# Patient Record
Sex: FEMALE | Race: WHITE | Employment: UNEMPLOYED | URBAN - METROPOLITAN AREA
[De-identification: names, ages, dates, MRNs, and addresses within clinical notes are randomized per-mention and may not be internally consistent; named-entity substitution may affect disease eponyms.]

---

## 2019-06-12 ENCOUNTER — HOSPITAL ENCOUNTER (EMERGENCY)
Age: 19
Discharge: HOME | End: 2019-06-13
Attending: EMERGENCY MEDICINE
Payer: COMMERCIAL

## 2019-06-12 VITALS
BODY MASS INDEX: 20.02 KG/M2 | DIASTOLIC BLOOD PRESSURE: 86 MMHG | WEIGHT: 113 LBS | HEART RATE: 90 BPM | RESPIRATION RATE: 18 BRPM | SYSTOLIC BLOOD PRESSURE: 122 MMHG | TEMPERATURE: 98.3 F | HEIGHT: 63 IN | OXYGEN SATURATION: 100 %

## 2019-06-12 DIAGNOSIS — N89.8 VAGINAL DISCHARGE: Primary | ICD-10-CM

## 2019-06-12 PROCEDURE — 81001 URINALYSIS AUTO W/SCOPE: CPT

## 2019-06-12 PROCEDURE — 84703 CHORIONIC GONADOTROPIN ASSAY: CPT

## 2019-06-12 PROCEDURE — 99283 EMERGENCY DEPT VISIT LOW MDM: CPT

## 2019-06-12 PROCEDURE — 87086 URINE CULTURE/COLONY COUNT: CPT

## 2019-06-13 LAB
AMORPHOUS: ABNORMAL /HPF
BACTERIA: ABNORMAL /HPF
BILIRUBIN URINE: NEGATIVE
BLOOD, URINE: NEGATIVE
CLARITY: ABNORMAL
COLOR: YELLOW
EPITHELIAL CELLS, UA: ABNORMAL /HPF
GLUCOSE URINE: NEGATIVE MG/DL
HCG(URINE) PREGNANCY TEST: NEGATIVE
KETONES, URINE: NEGATIVE MG/DL
LEUKOCYTE ESTERASE, URINE: ABNORMAL
MICROSCOPIC EXAMINATION: YES
MUCUS: ABNORMAL /LPF
NITRITE, URINE: NEGATIVE
PH UA: 7.5 (ref 5–8)
PROTEIN UA: ABNORMAL MG/DL
RBC UA: ABNORMAL /HPF (ref 0–2)
SPECIFIC GRAVITY UA: 1.01 (ref 1–1.03)
URINE REFLEX TO CULTURE: YES
URINE TYPE: ABNORMAL
UROBILINOGEN, URINE: 1 E.U./DL
WBC UA: ABNORMAL /HPF (ref 0–5)

## 2019-06-13 NOTE — ED PROVIDER NOTES
problem. I have reviewed the following from the nursing documentation:      Prior to Admission medications    Medication Sig Start Date End Date Taking? Authorizing Provider   NONFORMULARY Birth control   Yes Historical Provider, MD       Allergies as of 06/12/2019 - Review Complete 06/12/2019   Allergen Reaction Noted    Amoxicillin  06/12/2019    Pcn [penicillins]  06/12/2019       History reviewed. No pertinent past medical history. Surgical History: History reviewed. No pertinent surgical history. Family History:  History reviewed. No pertinent family history.     Social History     Socioeconomic History    Marital status: Single     Spouse name: Not on file    Number of children: Not on file    Years of education: Not on file    Highest education level: Not on file   Occupational History    Not on file   Social Needs    Financial resource strain: Not on file    Food insecurity:     Worry: Not on file     Inability: Not on file    Transportation needs:     Medical: Not on file     Non-medical: Not on file   Tobacco Use    Smoking status: Never Smoker    Smokeless tobacco: Never Used   Substance and Sexual Activity    Alcohol use: Yes     Comment: occ    Drug use: Never    Sexual activity: Not on file   Lifestyle    Physical activity:     Days per week: Not on file     Minutes per session: Not on file    Stress: Not on file   Relationships    Social connections:     Talks on phone: Not on file     Gets together: Not on file     Attends [employer] service: Not on file     Active member of club or organization: Not on file     Attends meetings of clubs or organizations: Not on file     Relationship status: Not on file    Intimate partner violence:     Fear of current or ex partner: Not on file     Emotionally abused: Not on file     Physically abused: Not on file     Forced sexual activity: Not on file   Other Topics Concern    Not on file   Social History Narrative    Not on file Nursing notes reviewed. ED Triage Vitals [06/12/19 2352]   Enc Vitals Group      /86      Pulse 90      Resp 18      Temp 98.3 °F (36.8 °C)      Temp Source Oral      SpO2 100 %      Weight 113 lb (51.3 kg)      Height 5' 3\" (1.6 m)      Head Circumference       Peak Flow       Pain Score       Pain Loc       Pain Edu? Excl. in [address]? GENERAL:  Awake, alert. Well developed, well nourished with no apparent distress. HENT:  Normocephalic, Atraumatic, no lacerations. EYES:  Conjunctiva normal, Pupils equal round and reactive to light, extraocular movements normal.  NECK:  Trachea is midline. No stridor. CHEST:  Regular rate and regular rhythm, no murmurs/rubs/gallops, normal S1/S2, chest wall non-tender. LUNGS:  No respiratory distress. No abdominal retractions, no sternal retractions. Clear to auscultation bilaterally, no wheezing, no rhochi, no rales  ABDOMEN:  Soft, non-tender, non-distended. No guarding and no rebound. No costovertebral angle tenderness to palpation. Normal BS, no organomegaly, no abdominal masses  PELVIC:  declined  EXTREMITIES:  Normal range of motion, no edema, no tenderness, no deformity, distal pulses present and equal bilaterally. Moves extremities x4 with purpose. SKIN: Warm, dry and intact. NEUROLOGIC: Normal mental status. Moving all extremities to command. Alert and oriented x4 without focal motor deficit or gross sensory deficit. Normal speech.     PSYCHIATRIC: Not anxious, normal mood and affect, thoughts are linear and organized, without delusions/hallucinations, responds appropriately to questions      LABS and DIAGNOSTIC RESULTS    LABS  Results for orders placed or performed during the hospital encounter of 06/12/19   Urinalysis Reflex to Culture   Result Value Ref Range    Color, UA Yellow Straw/Yellow    Clarity, UA SL CLOUDY (A) Clear    Glucose, Ur Negative Negative mg/dL    Bilirubin Urine Negative Negative    Ketones, Urine Negative Negative mg/dL    Specific Gravity, UA 1.015 1.005 - 1.030    Blood, Urine Negative Negative    pH, UA 7.5 5.0 - 8.0    Protein, UA TRACE (A) Negative mg/dL    Urobilinogen, Urine 1.0 <2.0 E.U./dL    Nitrite, Urine Negative Negative    Leukocyte Esterase, Urine TRACE (A) Negative    Microscopic Examination YES     Urine Reflex to Culture Yes     Urine Type Not Specified    Pregnancy, Urine   Result Value Ref Range    HCG(Urine) Pregnancy Test Negative Detects HCG level >20 MIU/mL   Microscopic Urinalysis   Result Value Ref Range    Mucus, UA Rare (A) /LPF    WBC, UA 3-5 0 - 5 /HPF    RBC, UA 0-2 0 - 2 /HPF    Epi Cells 20-50 /HPF    Bacteria, UA Rare (A) /HPF    Amorphous, UA 3+ (A) /HPF       PROCEDURES      MEDICAL DECISION MAKING    Procedures/interventions/images ordered for this visit  Orders Placed This Encounter   Procedures    Urine Culture    C.trachomatis N.gonorrhoeae DNA    Wet prep, genital    Urinalysis Reflex to Culture    Pregnancy, Urine    Microscopic Urinalysis       Medications ordered for this visit  No orders of the defined types were placed in this encounter. This is a very pleasant female patient who presents to ED for a vaginal complaint. Patient declined any further testing such as PCR for GC chlamydia, wet prep. I feel this is reasonable given that she most likely had all of his done at the urgent care visit 2 days ago. I encouraged her to be patient and wait for the results from her most recent visit. She understands that she is to abstain from sexual activity until f/u with her gynecologist or her PCP. Candida vaginitis, bacterial vaginosis, trichomonas, UTI and pregnancy have all been considered. Additionally, PID, cervicitis, syphilis, HSV, and atrophic dermatitis have also been considered but felt unlikely.  Based on the history, exams, and lab tests performed today, I do not see any acute medical condition that would require hospitalization, surgical intervention, or

## 2019-06-14 LAB — URINE CULTURE, ROUTINE: NORMAL

## 2019-09-14 ENCOUNTER — HOSPITAL ENCOUNTER (EMERGENCY)
Age: 19
Discharge: HOME | End: 2019-09-14
Payer: COMMERCIAL

## 2019-09-14 VITALS
OXYGEN SATURATION: 100 % | DIASTOLIC BLOOD PRESSURE: 90 MMHG | HEART RATE: 86 BPM | TEMPERATURE: 98 F | RESPIRATION RATE: 16 BRPM | SYSTOLIC BLOOD PRESSURE: 138 MMHG | WEIGHT: 114 LBS | BODY MASS INDEX: 20.19 KG/M2

## 2019-09-14 DIAGNOSIS — N91.2 AMENORRHEA: Primary | ICD-10-CM

## 2019-09-14 LAB
BACTERIA: ABNORMAL /HPF
BILIRUBIN URINE: NEGATIVE
BLOOD, URINE: NEGATIVE
CLARITY: CLEAR
COLOR: YELLOW
EPITHELIAL CELLS, UA: ABNORMAL /HPF
GLUCOSE URINE: NEGATIVE MG/DL
HCG(URINE) PREGNANCY TEST: NEGATIVE
KETONES, URINE: NEGATIVE MG/DL
LEUKOCYTE ESTERASE, URINE: ABNORMAL
MICROSCOPIC EXAMINATION: YES
NITRITE, URINE: NEGATIVE
PH UA: 6.5 (ref 5–8)
PROTEIN UA: ABNORMAL MG/DL
RBC UA: ABNORMAL /HPF (ref 0–2)
SPECIFIC GRAVITY UA: 1.01 (ref 1–1.03)
URINE REFLEX TO CULTURE: YES
URINE TYPE: ABNORMAL
UROBILINOGEN, URINE: 0.2 E.U./DL
WBC UA: ABNORMAL /HPF (ref 0–5)

## 2019-09-14 PROCEDURE — 84703 CHORIONIC GONADOTROPIN ASSAY: CPT

## 2019-09-14 PROCEDURE — 87086 URINE CULTURE/COLONY COUNT: CPT

## 2019-09-14 PROCEDURE — 99283 EMERGENCY DEPT VISIT LOW MDM: CPT

## 2019-09-14 PROCEDURE — 81001 URINALYSIS AUTO W/SCOPE: CPT

## 2019-09-14 RX ORDER — NORETHINDRONE ACETATE AND ETHINYL ESTRADIOL 1MG-20(21)
KIT ORAL
COMMUNITY
Start: 2019-09-10 | End: 2019-10-14 | Stop reason: SDUPTHER

## 2019-09-14 ASSESSMENT — ENCOUNTER SYMPTOMS
GASTROINTESTINAL NEGATIVE: 1
RESPIRATORY NEGATIVE: 1

## 2019-09-14 NOTE — ED NOTES
Discharge instructions reviewed with Ms. Chrissy Chavez. She verbalized understanding. Copy of discharge instructions and prescriptions given. Ms. Chrissy Chavez was discharged to home in good condition per personal vehicle, friend/family driving. She exited the ED without difficulty.    Merline Arm, RN  09/14/19 6386

## 2019-09-14 NOTE — ED PROVIDER NOTES
**[facility]  EMERGENC[facility]NCOUNTER      Pt Name: Dulce Maria Costello  GNQ:[id number]  [place] 2000  Date of evaluation: 9/14/2019  Provider: Yolande Bolanos PA-C      Chief Complaint:    Chief Complaint   Patient presents with    Amenorrhea     states a week late on period. did a preg test at urgent care on thursday that was neg. Nursing Notes, Past Medical Hx, Past Surgical Hx, Social Hx, Allergies, and Family Hx were all reviewed and agreed with or any disagreements were addressed in the HPI.    HPI:  (Location, Duration, Timing, Severity,Quality, Assoc Sx, Context, Modifying factors)  This is a  20 y.o. female brought in today by private vehicle for complaints of amenorrhea times one week. Patient states she was supposed to have her period about 1 week ago. Patient states she is on birth control Blisobi Fe and has been taking it appropriately at the same time daily. Patient denies any fevers, chills, nausea, vomiting abdominal pain, dysuria, hematuria. Denies any vaginal discharge or foul odor. Patient states she did take 2 pregnancy tests at home which were both negative at this time. Patient states she recently started school and has had increased stress over the past couple of weeks. She also states she had a recent viral infection. No aggravating symptoms. No alleviating symptoms. Patient denies any vaginal spotting or pelvic cramping. Patient denies any other complaints at this time. She has not tried anything else at this time for symptomatic relief. PastMedical/Surgical History:  History reviewed. No pertinent past medical history. History reviewed. No pertinent surgical history.     Medications:  Current Discharge Medication List      CONTINUE these medications which have NOT CHANGED    Details   BLISOVI FE 1/20 1-20 MG-MCG per tablet       NONFORMULARY Birth control               Review of Systems:  Review of Systems   Constitutional: Negative. HENT: Negative. Respiratory: Negative. Cardiovascular: Negative. Gastrointestinal: Negative. Genitourinary: Positive for menstrual problem. Musculoskeletal: Negative. Skin: Negative. Neurological: Negative. Hematological: Negative. Positives and Pertinent negatives as per HPI. Except as noted above in the ROS, problem specific ROS was completed and is negative. Physical Exam:  Physical Exam   Constitutional: She is oriented to person, place, and time. Vital signs are normal. She appears well-developed and well-nourished. Non-toxic appearance. She does not have a sickly appearance. She does not appear ill. No distress. HENT:   Head: Normocephalic and atraumatic. Nose: Nose normal.   Eyes: Right eye exhibits no discharge. Left eye exhibits no discharge. Neck: Normal range of motion. Neck supple. Cardiovascular: Normal rate, regular rhythm and normal heart sounds. Exam reveals no gallop. No murmur heard. Pulses:       Radial pulses are 2+ on the right side, and 2+ on the left side. Pulmonary/Chest: Effort normal and breath sounds normal. No stridor. No respiratory distress. She has no decreased breath sounds. She has no wheezes. She has no rhonchi. She has no rales. She exhibits no tenderness. Musculoskeletal: Normal range of motion. She exhibits no deformity. Neurological: She is alert and oriented to person, place, and time. Skin: Skin is warm, dry and intact. No rash noted. She is not diaphoretic. Psychiatric: She has a normal mood and affect. Her behavior is normal.   Nursing note and vitals reviewed.       MEDICAL DECISION MAKING    Vitals:    Vitals:    09/14/19 1310   BP: (!) 138/90   Pulse: 86   Resp: 16   Temp: 98 °F (36.7 °C)   TempSrc: Oral   SpO2: 100%   Weight: 114 lb (51.7 kg)       LABS:  Labs Reviewed   URINE RT REFLEX TO CULTURE - Abnormal; Notable for the following components:       Result Value    Protein,

## 2019-09-15 LAB — URINE CULTURE, ROUTINE: NORMAL

## 2019-09-30 ENCOUNTER — OFFICE VISIT (OUTPATIENT)
Dept: OBGYN CLINIC | Age: 19
End: 2019-09-30
Payer: COMMERCIAL

## 2019-09-30 VITALS
WEIGHT: 114 LBS | SYSTOLIC BLOOD PRESSURE: 108 MMHG | TEMPERATURE: 98.1 F | HEIGHT: 63 IN | HEART RATE: 79 BPM | DIASTOLIC BLOOD PRESSURE: 62 MMHG | BODY MASS INDEX: 20.2 KG/M2

## 2019-09-30 DIAGNOSIS — Z79.3 ON ORAL CONTRACEPTIVE PILLS FOR NON-CONTRACEPTION INDICATION: ICD-10-CM

## 2019-09-30 DIAGNOSIS — N92.6 MISSED MENSES: Primary | ICD-10-CM

## 2019-09-30 PROBLEM — N94.6 DYSMENORRHEA: Status: ACTIVE | Noted: 2017-08-26

## 2019-09-30 PROCEDURE — G8420 CALC BMI NORM PARAMETERS: HCPCS | Performed by: OBSTETRICS & GYNECOLOGY

## 2019-09-30 PROCEDURE — 99202 OFFICE O/P NEW SF 15 MIN: CPT | Performed by: OBSTETRICS & GYNECOLOGY

## 2019-09-30 PROCEDURE — G8427 DOCREV CUR MEDS BY ELIG CLIN: HCPCS | Performed by: OBSTETRICS & GYNECOLOGY

## 2019-09-30 PROCEDURE — 1036F TOBACCO NON-USER: CPT | Performed by: OBSTETRICS & GYNECOLOGY

## 2019-09-30 PROCEDURE — 36415 COLL VENOUS BLD VENIPUNCTURE: CPT | Performed by: OBSTETRICS & GYNECOLOGY

## 2019-09-30 RX ORDER — NAPROXEN 250 MG/1
500 TABLET ORAL 2 TIMES DAILY PRN
COMMUNITY
Start: 2017-08-26

## 2019-09-30 ASSESSMENT — ENCOUNTER SYMPTOMS
SORE THROAT: 0
SHORTNESS OF BREATH: 0
NAUSEA: 0
ABDOMINAL PAIN: 0
CONSTIPATION: 0
COUGH: 0
DIARRHEA: 0
VOMITING: 0

## 2019-09-30 NOTE — PROGRESS NOTES
New Patient Gynecologic Exam      CC:   Chief Complaint   Patient presents with    Amenorrhea     NEW PATIENT        HPI:  20 y.o.  presents for evaluation of missed menses    Patient seen and examined. Patient is overall doing well today    Patient is currently on Blisovi for irregular painful menses. Patient reports prior to [facility] she had menses every 2-3 weeks that were very heavy. Patient started OCPs 2 years ago. Reports with OCPs her menses have been very light and regular with the placebo. Last menses in August with placebo week, no bleeding with placebo pills in September. Reports pain has improved with OCPs. Negative pregnancy test at home x6. Patient reports increased stress surrounding start of Respiratory Therapy program at the end of August.      Patient is sexually active, denies concerns. Health Maintenance:  Birth control: Blisovi  Pregnancy plans: None currently  Safe relationship: Together 3 years    Screening:  Last pap smear: N/A  History of abnormal pap smears: N/A  STI screenin2018    Review of Systems:   Review of Systems   Constitutional: Negative for chills and fever. HENT: Negative for congestion, sneezing and sore throat. Respiratory: Negative for cough and shortness of breath. Cardiovascular: Negative for chest pain and palpitations. Gastrointestinal: Negative for abdominal pain, constipation, diarrhea, nausea and vomiting. Genitourinary: Positive for menstrual problem. Negative for dysuria, frequency, pelvic pain and vaginal discharge. Allergic/Immunologic: Negative for environmental allergies. Neurological: Negative for dizziness and headaches. Primary Care Physician: Helen Medina MD    Obstetric History  OB History    Para Term  AB Living   0 0 0 0 0 0   SAB TAB Ectopic Molar Multiple Live Births   0 0 0 0 0 0       GynecologicHistory  Menstrual History:   LMP: Patient's last menstrual period was 2019.     Age of

## 2019-10-01 LAB — GONADOTROPIN, CHORIONIC (HCG) QUANT: <5 MIU/ML

## 2019-10-14 RX ORDER — NORETHINDRONE ACETATE AND ETHINYL ESTRADIOL 1MG-20(21)
1 KIT ORAL DAILY
Qty: 1 PACKET | Refills: 11 | Status: SHIPPED | OUTPATIENT
Start: 2019-10-14 | End: 2020-01-31

## 2019-10-14 NOTE — TELEPHONE ENCOUNTER
Patient called for a refill. Her pharmacy called her and informed her she is out of [facility]. She was not sure when she was in for her new patient appointment on 9/30/19. Refill pending.

## 2019-12-27 ENCOUNTER — HOSPITAL ENCOUNTER (EMERGENCY)
Age: 19
Discharge: LEFT BEFORE DISCHARGE | End: 2019-12-27
Payer: COMMERCIAL

## 2019-12-27 VITALS
TEMPERATURE: 99 F | HEIGHT: 63 IN | SYSTOLIC BLOOD PRESSURE: 130 MMHG | HEART RATE: 107 BPM | OXYGEN SATURATION: 100 % | WEIGHT: 115 LBS | DIASTOLIC BLOOD PRESSURE: 82 MMHG | RESPIRATION RATE: 16 BRPM | BODY MASS INDEX: 20.38 KG/M2

## 2019-12-27 PROCEDURE — 4500000002 HC ER NO CHARGE

## 2019-12-27 ASSESSMENT — PAIN DESCRIPTION - ORIENTATION: ORIENTATION: LEFT;LOWER

## 2019-12-27 ASSESSMENT — PAIN DESCRIPTION - FREQUENCY: FREQUENCY: CONTINUOUS

## 2019-12-27 ASSESSMENT — PAIN SCALES - GENERAL: PAINLEVEL_OUTOF10: 5

## 2019-12-27 ASSESSMENT — PAIN DESCRIPTION - LOCATION: LOCATION: ABDOMEN

## 2019-12-28 ENCOUNTER — HOSPITAL ENCOUNTER (EMERGENCY)
Age: 19
Discharge: HOME | End: 2019-12-28
Attending: EMERGENCY MEDICINE
Payer: COMMERCIAL

## 2019-12-28 ENCOUNTER — APPOINTMENT (OUTPATIENT)
Dept: CT IMAGING | Age: 19
End: 2019-12-28
Payer: COMMERCIAL

## 2019-12-28 VITALS
SYSTOLIC BLOOD PRESSURE: 130 MMHG | WEIGHT: 115 LBS | OXYGEN SATURATION: 99 % | DIASTOLIC BLOOD PRESSURE: 80 MMHG | BODY MASS INDEX: 20.38 KG/M2 | TEMPERATURE: 98.1 F | HEIGHT: 63 IN | RESPIRATION RATE: 16 BRPM | HEART RATE: 87 BPM

## 2019-12-28 DIAGNOSIS — R10.30 LOWER ABDOMINAL PAIN: Primary | ICD-10-CM

## 2019-12-28 DIAGNOSIS — K52.9 COLITIS: ICD-10-CM

## 2019-12-28 LAB
BACTERIA: ABNORMAL /HPF
BILIRUBIN URINE: NEGATIVE
BLOOD, URINE: ABNORMAL
CLARITY: CLEAR
COLOR: YELLOW
EPITHELIAL CELLS, UA: ABNORMAL /HPF
GLUCOSE URINE: NEGATIVE MG/DL
HCG(URINE) PREGNANCY TEST: NEGATIVE
KETONES, URINE: NEGATIVE MG/DL
LEUKOCYTE ESTERASE, URINE: NEGATIVE
MICROSCOPIC EXAMINATION: YES
NITRITE, URINE: NEGATIVE
PH UA: 6 (ref 5–8)
PROTEIN UA: 30 MG/DL
RBC UA: ABNORMAL /HPF (ref 0–2)
SPECIFIC GRAVITY UA: >=1.03 (ref 1–1.03)
URINE TYPE: ABNORMAL
UROBILINOGEN, URINE: 0.2 E.U./DL
WBC UA: ABNORMAL /HPF (ref 0–5)

## 2019-12-28 PROCEDURE — 99284 EMERGENCY DEPT VISIT MOD MDM: CPT

## 2019-12-28 PROCEDURE — 81001 URINALYSIS AUTO W/SCOPE: CPT

## 2019-12-28 PROCEDURE — 84703 CHORIONIC GONADOTROPIN ASSAY: CPT

## 2019-12-28 PROCEDURE — 74176 CT ABD & PELVIS W/O CONTRAST: CPT

## 2019-12-28 RX ORDER — RANITIDINE 150 MG/1
150 TABLET ORAL DAILY
COMMUNITY
Start: 2019-12-20 | End: 2020-06-23

## 2019-12-28 RX ORDER — METRONIDAZOLE 500 MG/1
500 TABLET ORAL 2 TIMES DAILY
Qty: 14 TABLET | Refills: 0 | Status: SHIPPED | OUTPATIENT
Start: 2019-12-28 | End: 2020-01-04

## 2019-12-28 ASSESSMENT — PAIN DESCRIPTION - LOCATION: LOCATION: ABDOMEN

## 2019-12-28 ASSESSMENT — PAIN DESCRIPTION - PAIN TYPE: TYPE: ACUTE PAIN

## 2019-12-28 ASSESSMENT — PAIN SCALES - GENERAL: PAINLEVEL_OUTOF10: 5

## 2020-01-01 ENCOUNTER — HOSPITAL ENCOUNTER (EMERGENCY)
Age: 20
Discharge: HOME | End: 2020-01-02
Payer: COMMERCIAL

## 2020-01-01 LAB
A/G RATIO: 1.6 (ref 1.1–2.2)
ALBUMIN SERPL-MCNC: 5.2 G/DL (ref 3.4–5)
ALP BLD-CCNC: 52 U/L (ref 40–129)
ALT SERPL-CCNC: 13 U/L (ref 10–40)
AMORPHOUS: ABNORMAL /HPF
ANION GAP SERPL CALCULATED.3IONS-SCNC: 11 MMOL/L (ref 3–16)
AST SERPL-CCNC: 16 U/L (ref 15–37)
BASOPHILS ABSOLUTE: 0 K/UL (ref 0–0.2)
BASOPHILS RELATIVE PERCENT: 0.2 %
BILIRUB SERPL-MCNC: 0.5 MG/DL (ref 0–1)
BILIRUBIN URINE: NEGATIVE
BLOOD, URINE: NEGATIVE
BUN BLDV-MCNC: 13 MG/DL (ref 7–20)
CALCIUM SERPL-MCNC: 9.8 MG/DL (ref 8.3–10.6)
CASTS: ABNORMAL /LPF
CHLORIDE BLD-SCNC: 100 MMOL/L (ref 99–110)
CLARITY: CLEAR
CO2: 25 MMOL/L (ref 21–32)
COLOR: YELLOW
CREAT SERPL-MCNC: 0.8 MG/DL (ref 0.6–1.1)
EOSINOPHILS ABSOLUTE: 0 K/UL (ref 0–0.6)
EOSINOPHILS RELATIVE PERCENT: 0 %
EPITHELIAL CELLS, UA: ABNORMAL /HPF
GFR AFRICAN AMERICAN: >60
GFR NON-AFRICAN AMERICAN: >60
GLOBULIN: 3.2 G/DL
GLUCOSE BLD-MCNC: 130 MG/DL (ref 70–99)
GLUCOSE URINE: NEGATIVE MG/DL
HCG(URINE) PREGNANCY TEST: NEGATIVE
HCT VFR BLD CALC: 45.3 % (ref 36–48)
HEMOGLOBIN: 15.6 G/DL (ref 12–16)
KETONES, URINE: NEGATIVE MG/DL
LEUKOCYTE ESTERASE, URINE: NEGATIVE
LIPASE: 36 U/L (ref 13–60)
LYMPHOCYTES ABSOLUTE: 0.8 K/UL (ref 1–5.1)
LYMPHOCYTES RELATIVE PERCENT: 11.2 %
MCH RBC QN AUTO: 31 PG (ref 26–34)
MCHC RBC AUTO-ENTMCNC: 34.4 G/DL (ref 31–36)
MCV RBC AUTO: 90.2 FL (ref 80–100)
MICROSCOPIC EXAMINATION: YES
MONOCYTES ABSOLUTE: 0.3 K/UL (ref 0–1.3)
MONOCYTES RELATIVE PERCENT: 3.9 %
MUCUS: ABNORMAL /LPF
NEUTROPHILS ABSOLUTE: 6 K/UL (ref 1.7–7.7)
NEUTROPHILS RELATIVE PERCENT: 84.7 %
NITRITE, URINE: NEGATIVE
PDW BLD-RTO: 12.3 % (ref 12.4–15.4)
PH UA: 7 (ref 5–8)
PLATELET # BLD: 156 K/UL (ref 135–450)
PMV BLD AUTO: 8.7 FL (ref 5–10.5)
POTASSIUM SERPL-SCNC: 3.5 MMOL/L (ref 3.5–5.1)
PROTEIN UA: 30 MG/DL
RBC # BLD: 5.02 M/UL (ref 4–5.2)
RBC UA: ABNORMAL /HPF (ref 0–2)
SODIUM BLD-SCNC: 136 MMOL/L (ref 136–145)
SPECIFIC GRAVITY UA: 1.02 (ref 1–1.03)
TOTAL PROTEIN: 8.4 G/DL (ref 6.4–8.2)
URINE REFLEX TO CULTURE: ABNORMAL
URINE TYPE: ABNORMAL
UROBILINOGEN, URINE: 0.2 E.U./DL
WBC # BLD: 7.1 K/UL (ref 4–11)
WBC UA: ABNORMAL /HPF (ref 0–5)

## 2020-01-01 PROCEDURE — 83690 ASSAY OF LIPASE: CPT

## 2020-01-01 PROCEDURE — 99284 EMERGENCY DEPT VISIT MOD MDM: CPT

## 2020-01-01 PROCEDURE — 84703 CHORIONIC GONADOTROPIN ASSAY: CPT

## 2020-01-01 PROCEDURE — 85025 COMPLETE CBC W/AUTO DIFF WBC: CPT

## 2020-01-01 PROCEDURE — 85379 FIBRIN DEGRADATION QUANT: CPT

## 2020-01-01 PROCEDURE — 80053 COMPREHEN METABOLIC PANEL: CPT

## 2020-01-01 PROCEDURE — 81001 URINALYSIS AUTO W/SCOPE: CPT

## 2020-01-01 RX ORDER — CETIRIZINE HYDROCHLORIDE 10 MG/1
10 TABLET ORAL ONCE
Status: DISCONTINUED | OUTPATIENT
Start: 2020-01-01 | End: 2020-01-01

## 2020-01-01 RX ORDER — DICYCLOMINE HYDROCHLORIDE 10 MG/1
10 CAPSULE ORAL ONCE
Status: COMPLETED | OUTPATIENT
Start: 2020-01-02 | End: 2020-01-02

## 2020-01-01 RX ORDER — OXYCODONE HYDROCHLORIDE AND ACETAMINOPHEN 5; 325 MG/1; MG/1
1 TABLET ORAL ONCE
Status: DISCONTINUED | OUTPATIENT
Start: 2020-01-01 | End: 2020-01-01

## 2020-01-01 RX ORDER — 0.9 % SODIUM CHLORIDE 0.9 %
1000 INTRAVENOUS SOLUTION INTRAVENOUS ONCE
Status: COMPLETED | OUTPATIENT
Start: 2020-01-02 | End: 2020-01-02

## 2020-01-01 RX ORDER — ONDANSETRON 2 MG/ML
4 INJECTION INTRAMUSCULAR; INTRAVENOUS ONCE
Status: COMPLETED | OUTPATIENT
Start: 2020-01-02 | End: 2020-01-02

## 2020-01-01 RX ORDER — NAPROXEN 250 MG/1
250 TABLET ORAL ONCE
Status: DISCONTINUED | OUTPATIENT
Start: 2020-01-01 | End: 2020-01-01

## 2020-01-01 ASSESSMENT — PAIN DESCRIPTION - FREQUENCY: FREQUENCY: CONTINUOUS

## 2020-01-01 ASSESSMENT — PAIN DESCRIPTION - LOCATION: LOCATION: ABDOMEN;BACK

## 2020-01-01 ASSESSMENT — PAIN DESCRIPTION - PAIN TYPE: TYPE: ACUTE PAIN

## 2020-01-01 ASSESSMENT — PAIN DESCRIPTION - PROGRESSION: CLINICAL_PROGRESSION: GRADUALLY WORSENING

## 2020-01-01 ASSESSMENT — PAIN DESCRIPTION - ORIENTATION: ORIENTATION: RIGHT

## 2020-01-01 ASSESSMENT — PAIN DESCRIPTION - DESCRIPTORS: DESCRIPTORS: SHARP

## 2020-01-01 ASSESSMENT — PAIN DESCRIPTION - ONSET: ONSET: GRADUAL

## 2020-01-01 ASSESSMENT — PAIN SCALES - GENERAL: PAINLEVEL_OUTOF10: 5

## 2020-01-02 VITALS
HEART RATE: 71 BPM | HEIGHT: 63 IN | BODY MASS INDEX: 20.38 KG/M2 | DIASTOLIC BLOOD PRESSURE: 74 MMHG | WEIGHT: 115 LBS | SYSTOLIC BLOOD PRESSURE: 117 MMHG | RESPIRATION RATE: 16 BRPM | OXYGEN SATURATION: 99 % | TEMPERATURE: 98.2 F

## 2020-01-02 LAB — D DIMER: <200 NG/ML DDU (ref 0–229)

## 2020-01-02 PROCEDURE — 96374 THER/PROPH/DIAG INJ IV PUSH: CPT

## 2020-01-02 PROCEDURE — 96361 HYDRATE IV INFUSION ADD-ON: CPT

## 2020-01-02 PROCEDURE — 2580000003 HC RX 258: Performed by: NURSE PRACTITIONER

## 2020-01-02 PROCEDURE — 6370000000 HC RX 637 (ALT 250 FOR IP): Performed by: NURSE PRACTITIONER

## 2020-01-02 PROCEDURE — 6360000002 HC RX W HCPCS: Performed by: NURSE PRACTITIONER

## 2020-01-02 RX ORDER — DICYCLOMINE HYDROCHLORIDE 10 MG/1
10 CAPSULE ORAL
Qty: 120 CAPSULE | Refills: 3 | Status: SHIPPED | OUTPATIENT
Start: 2020-01-02

## 2020-01-02 RX ADMIN — DICYCLOMINE HYDROCHLORIDE 10 MG: 10 CAPSULE ORAL at 00:15

## 2020-01-02 RX ADMIN — SODIUM CHLORIDE 1000 ML: 9 INJECTION, SOLUTION INTRAVENOUS at 00:15

## 2020-01-02 RX ADMIN — ONDANSETRON HYDROCHLORIDE 4 MG: 2 INJECTION, SOLUTION INTRAMUSCULAR; INTRAVENOUS at 00:16

## 2020-01-02 ASSESSMENT — ENCOUNTER SYMPTOMS
RHINORRHEA: 0
COLOR CHANGE: 0
SORE THROAT: 0
ABDOMINAL PAIN: 1
SHORTNESS OF BREATH: 0

## 2020-01-02 NOTE — ED PROVIDER NOTES
Evaluated by Advanced Practice Provider          [facility]  EMERGENCY DEPARTMENT ENCOUNTER        Pt Name: Maranda Bennett  MRN: [id number]  [place] 2000  Dateof evaluation: 1/1/2020  Provider: CHRIST Arredondo - CNP  PCP: Lo Orellana MD  ED Attending: No att. providers found    [address] [facility]       Chief Complaint   Patient presents with    Dizziness     pt states had lightheadedness and dizziness this morning. Pt states that she has had an increase in urinary frequence and rt sided back and abd pain. HISTORY OF PRESENTILLNESS   (Location/Symptom, Timing/Onset, Context/Setting, Quality, Duration, Modifying Factors, Severity)  Note limiting factors. Maranda Bennett is a 20 y.o. female for lower abd pain. Onset was awhile. Duration has been since the onet. Context includes pt states she has had lower abd pain for \"awhile\". She reports she was just seen and had a ct scan. She was told she had colitis but states she was dizzy this am. Pt is on birth control. Alleviating factors include nothing. Aggravating factors include nothing. Pain is 5/10. nothing has been used for pain today. Nursing Notes were all reviewed and agreed with or any disagreements were addressed  in the HPI. REVIEW OF SYSTEMS    (2-9 systems for level 4, 10 or more for level 5)     Review of Systems   Constitutional: Negative for fever. HENT: Negative for congestion, rhinorrhea and sore throat. Respiratory: Negative for shortness of breath. Cardiovascular: Negative for chest pain. Gastrointestinal: Positive for abdominal pain. Genitourinary: Negative for decreased urine volume and difficulty urinating. Musculoskeletal: Negative for arthralgias and myalgias. Skin: Negative for color change and rash. Neurological: Positive for dizziness. Negative for light-headedness. Psychiatric/Behavioral: Negative for agitation. All other systems reviewed and are negative.       Positives and following components:    Glucose 130 (*)     Total Protein 8.4 (*)     Alb 5.2 (*)     All other components within normal limits    Narrative:     Performed at:  [facility] [address],  [place], [employer]   Phone [phone]   MICROSCOPIC URINALYSIS - Abnormal; Notable for the following components:    Casts 0-1 Hyaline (*)     Mucus, UA 2+ (*)     Amorphous, UA 2+ (*)     All other components within normal limits    Narrative:     Performed at:  [facility] [address],  [place], [email or web address]   Phone [phone]   PREGNANCY, URINE    Narrative:     Performed at:  [place] [address],  [place], [place] [email or web address]   Phone [phone]   LIPASE    Narrative:     Performed at:  [place] [address],  [place], [place] [facility]   Phone [phone]   D-DIMER, QUANTITATIVE    Narrative:     Performed at:  [facility]) [facility] [address],  [place], [employer]   Phone [phone]       All other labs werewithin normal range or not returned as of this dictation. EKG: All EKG's are interpreted by the Emergency Department Physician who either signs or Co-signs this chart in the absence of acardiologist.  Please see their note for interpretation of EKG. RADIOLOGY:           Interpretation per the Radiologist below, if available at the time of this note:    No orders to display     No results found.       PROCEDURES   Unless otherwise noted below, none     Procedures     CRITICAL CARE TIME   N/A    CONSULTS:  None      EMERGENCYDEPARTMENT COURSE and DIFFERENTIAL DIAGNOSIS/MDM:   Vitals:    Vitals:    01/01/20 2208 01/01/20 2211 01/01/20 2315 01/02/20 0006   BP:  (!) 158/97 126/84 117/74   Pulse: 102   71   Resp: 16   16   TempSrc: Oral      SpO2: 99%  100% 99%   Weight: 115 lb (52.2 kg)      Height: 5' 3\" (1.6 m)          Patient was given the following medications:  Medications   0.9 % sodium chloride bolus (1,000 mLs Intravenous New Bag 1/2/20 0015)   ondansetron (ZOFRAN) injection 4 mg (4 mg Intravenous Given 1/2/20 0016)   dicyclomine (BENTYL) capsule 10 mg (10 mg Oral Given 1/2/20 0015)       Patient was seen and evaluated by myself. Patient here for complaints of dizziness however she is also stating that she has had lower abdominal pain. Patient reports that she was seen recently for her pain and diagnosed with colitis. She reports she does have an appointment scheduled in the morning with a GI doctor. Patient states that she did have an episode of dizziness today when she got up but her symptoms have since resolved from the dizziness. On exam she is awake and alert she was found to be tachycardic however she was very anxious and tearful. She was given IV fluids Bentyl and Zofran. On reevaluation her heart rate has improved and she states that she does feel better. Patient will be discharged home with a prescription for Bentyl. She was encouraged to keep her appointment with the GI doctor later today. She was encouraged to follow-up with her primary care doctor in the next few days and return to the ED for worsening symptoms. I did discuss the case with Dr. Maricela Mcburney however he did not see the patient he was in agreement with the plan. The patient tolerated their visit well. I have evaluated thispatient. My supervising physician was available for consultation. The patient and / or the family were informed of the results of any tests, a time was given to answer questions, a plan was proposed and they agreed Gallo Mina. FINAL IMPRESSION      1. Dizziness    2. Lightheadedness    3.  History of colitis          DISPOSITION/PLAN   DISPOSITION Decision To Discharge 01/02/2020 12:52:38 AM      PATIENT REFERRED TO:  Werner Pastrana MD  [address] [address]  [phone]    Schedule an appointment as soon as possible for a visit in 2 days  for re-evaluation    [place] [facility]  [address] [facility]  [phone]    If symptoms worsen      DISCHARGE MEDICATIONS:  New Prescriptions    DICYCLOMINE (BENTYL) 10 MG CAPSULE    Take 1 capsule by mouth 4 times daily (before meals and nightly)       DISCONTINUED MEDICATIONS:  Discontinued Medications    No medications on file              (Please note that portions of this note were completed with a voice recognition program.  Efforts were made to edit the dictations but occasionally words are mis-transcribed.)    CHRIST Zurita CNP (electronically signed)         CHRIST Zurita CNP  01/02/20 0102

## 2020-01-30 NOTE — TELEPHONE ENCOUNTER
Pt states she received notice from her insurance that they no longer cover her OCP- Blisovi FE. She states she has done really well on this medication and wants to know if a similar medication can be ordered.  Please advise

## 2020-01-31 RX ORDER — NORETHINDRONE ACETATE AND ETHINYL ESTRADIOL 1MG-20(21)
1 KIT ORAL DAILY
Qty: 1 PACKET | Refills: 3 | Status: SHIPPED | OUTPATIENT
Start: 2020-01-31 | End: 2020-06-23

## 2020-03-13 ENCOUNTER — HOSPITAL ENCOUNTER (EMERGENCY)
Age: 20
Discharge: HOME | End: 2020-03-13
Payer: COMMERCIAL

## 2020-03-13 VITALS
OXYGEN SATURATION: 100 % | HEIGHT: 63 IN | WEIGHT: 115 LBS | BODY MASS INDEX: 20.38 KG/M2 | TEMPERATURE: 98.7 F | SYSTOLIC BLOOD PRESSURE: 129 MMHG | DIASTOLIC BLOOD PRESSURE: 83 MMHG | HEART RATE: 123 BPM | RESPIRATION RATE: 20 BRPM

## 2020-03-13 LAB
A/G RATIO: 2 (ref 1.1–2.2)
ALBUMIN SERPL-MCNC: 5 G/DL (ref 3.4–5)
ALP BLD-CCNC: 50 U/L (ref 40–129)
ALT SERPL-CCNC: 10 U/L (ref 10–40)
ANION GAP SERPL CALCULATED.3IONS-SCNC: 14 MMOL/L (ref 3–16)
AST SERPL-CCNC: 19 U/L (ref 15–37)
BASOPHILS ABSOLUTE: 0 K/UL (ref 0–0.2)
BASOPHILS RELATIVE PERCENT: 0.3 %
BILIRUB SERPL-MCNC: 0.5 MG/DL (ref 0–1)
BUN BLDV-MCNC: 14 MG/DL (ref 7–20)
CALCIUM SERPL-MCNC: 9.6 MG/DL (ref 8.3–10.6)
CHLORIDE BLD-SCNC: 106 MMOL/L (ref 99–110)
CO2: 22 MMOL/L (ref 21–32)
CREAT SERPL-MCNC: 0.7 MG/DL (ref 0.6–1.1)
D DIMER: 218 NG/ML DDU (ref 0–229)
EKG ATRIAL RATE: 116 BPM
EKG DIAGNOSIS: NORMAL
EKG P AXIS: 57 DEGREES
EKG P-R INTERVAL: 168 MS
EKG Q-T INTERVAL: 332 MS
EKG QRS DURATION: 90 MS
EKG QTC CALCULATION (BAZETT): 461 MS
EKG R AXIS: 84 DEGREES
EKG T AXIS: -32 DEGREES
EKG VENTRICULAR RATE: 116 BPM
EOSINOPHILS ABSOLUTE: 0 K/UL (ref 0–0.6)
EOSINOPHILS RELATIVE PERCENT: 0.4 %
GFR AFRICAN AMERICAN: >60
GFR NON-AFRICAN AMERICAN: >60
GLOBULIN: 2.5 G/DL
GLUCOSE BLD-MCNC: 91 MG/DL (ref 70–99)
HCG QUALITATIVE: NEGATIVE
HCT VFR BLD CALC: 43.1 % (ref 36–48)
HEMOGLOBIN: 15.1 G/DL (ref 12–16)
LYMPHOCYTES ABSOLUTE: 1.5 K/UL (ref 1–5.1)
LYMPHOCYTES RELATIVE PERCENT: 14.9 %
MCH RBC QN AUTO: 30.9 PG (ref 26–34)
MCHC RBC AUTO-ENTMCNC: 35 G/DL (ref 31–36)
MCV RBC AUTO: 88.2 FL (ref 80–100)
MONOCYTES ABSOLUTE: 0.4 K/UL (ref 0–1.3)
MONOCYTES RELATIVE PERCENT: 3.6 %
NEUTROPHILS ABSOLUTE: 8 K/UL (ref 1.7–7.7)
NEUTROPHILS RELATIVE PERCENT: 80.8 %
PDW BLD-RTO: 12.6 % (ref 12.4–15.4)
PLATELET # BLD: 113 K/UL (ref 135–450)
PMV BLD AUTO: 8.9 FL (ref 5–10.5)
POTASSIUM REFLEX MAGNESIUM: 3.8 MMOL/L (ref 3.5–5.1)
RBC # BLD: 4.88 M/UL (ref 4–5.2)
SODIUM BLD-SCNC: 142 MMOL/L (ref 136–145)
TOTAL PROTEIN: 7.5 G/DL (ref 6.4–8.2)
TROPONIN: <0.01 NG/ML
WBC # BLD: 9.9 K/UL (ref 4–11)

## 2020-03-13 PROCEDURE — 85025 COMPLETE CBC W/AUTO DIFF WBC: CPT

## 2020-03-13 PROCEDURE — 84484 ASSAY OF TROPONIN QUANT: CPT

## 2020-03-13 PROCEDURE — 84703 CHORIONIC GONADOTROPIN ASSAY: CPT

## 2020-03-13 PROCEDURE — 80053 COMPREHEN METABOLIC PANEL: CPT

## 2020-03-13 PROCEDURE — 93005 ELECTROCARDIOGRAM TRACING: CPT | Performed by: EMERGENCY MEDICINE

## 2020-03-13 PROCEDURE — 93010 ELECTROCARDIOGRAM REPORT: CPT | Performed by: INTERNAL MEDICINE

## 2020-03-13 PROCEDURE — 85379 FIBRIN DEGRADATION QUANT: CPT

## 2020-03-13 PROCEDURE — 99285 EMERGENCY DEPT VISIT HI MDM: CPT

## 2020-03-13 PROCEDURE — 36415 COLL VENOUS BLD VENIPUNCTURE: CPT

## 2020-03-13 RX ORDER — 0.9 % SODIUM CHLORIDE 0.9 %
1000 INTRAVENOUS SOLUTION INTRAVENOUS ONCE
Status: DISCONTINUED | OUTPATIENT
Start: 2020-03-13 | End: 2020-03-13 | Stop reason: HOSPADM

## 2020-03-13 ASSESSMENT — PAIN DESCRIPTION - LOCATION: LOCATION: RIB CAGE

## 2020-03-13 ASSESSMENT — PAIN DESCRIPTION - PAIN TYPE: TYPE: ACUTE PAIN

## 2020-03-13 ASSESSMENT — PAIN SCALES - GENERAL: PAINLEVEL_OUTOF10: 2

## 2020-03-13 ASSESSMENT — PAIN DESCRIPTION - DESCRIPTORS: DESCRIPTORS: DISCOMFORT

## 2020-03-13 ASSESSMENT — PAIN DESCRIPTION - ORIENTATION: ORIENTATION: LEFT

## 2020-03-13 ASSESSMENT — PAIN DESCRIPTION - FREQUENCY: FREQUENCY: INTERMITTENT

## 2020-03-13 NOTE — ED NOTES
Reviewed patient discharge instructions at this time, copy given to patient. No questions or concerns. Patient voiced understanding.         Genesis Christensen RN  03/13/20 4608

## 2020-03-13 NOTE — ED PROVIDER NOTES
[facility] [facility] Name: Mark Echols  MRN: [id number]  [place] 2000  Date of evaluation: 3/13/2020  Provider: Sierra Caldwell MD  PCP: Joeseph Runner, MD    This patient was seen and evaluated by the attending physician No att. providers found. CHIEF COMPLAINT       Chief Complaint   Patient presents with    Palpitations     since tuesday       HISTORY OF PRESENT ILLNESS   (Location/Symptom, Timing/Onset, Context/Setting, Quality, Duration, Modifying Factors, Severity)  Note limiting factors. Mark Echols is a 20 y.o. female here today with a CC of 3-4d of intermittent palpitations with some associated nausea without any vomiting or diarrhea. No dyspnea. No cough no chest pain. Does note some asymmetry feeling in her left ribs. No tingling was weeks paresthesia. No prior DVT PE no recent change in health. No recent travel flights contrast parasternally can see. She is on OCPs however. Nursing Notes were all reviewed and agreed with or any disagreements were addressed  in the HPI. REVIEW OF SYSTEMS    (2-9 systems for level 4, 10 or more for level 5)     Review of Systems    Positives and Pertinent negatives as per HPI. Except as noted abovein the ROS, all other systems were reviewed and negative. PAST MEDICAL HISTORY     Past Medical History:   Diagnosis Date    Depression          SURGICAL HISTORY   History reviewed. No pertinent surgical history.       CURRENTMEDICATIONS       Previous Medications    DICYCLOMINE (BENTYL) 10 MG CAPSULE    Take 1 capsule by mouth 4 times daily (before meals and nightly)    NAPROXEN (NAPROSYN) 250 MG TABLET    Take 500 mg by mouth 2 times daily as needed     NORETHINDRONE-ETHINYL ESTRADIOL (JUNEL FE 1/20) 1-20 MG-MCG PER TABLET    Take 1 tablet by mouth daily    PREDNISOLONE 5 MG TABS    20 mg po qd x 5 days  15 mg po qd x 5 days  10 mg po qd x 5 days  5 mg po qd x 5 days abnormality, atraumatic. Eyes:  conjunctiva/corneas clear, EOM's intact. Sclera anicteric. ENT: Mucous membranes moist.   Neck: Supple, symmetrical, trachea midline, no adenopathy. No jugular venous distention. Lungs:   No Respiratory Distress. Chest Wall:   Atraumatic   Heart:   Fast rates irregular regular rhythm no murmurs clicks gallops or rubs   Abdomen:    Soft nontender nondistended   Extremities:  Full range of motion. Pulses:  Equal and symmetric x4. Skin:  No rashes or lesions to exposed skin. Neurologic: Alert and oriented X 3. Motor grossly normal.  Speech clear. DIAGNOSTIC RESULTS   LABS:    Labs Reviewed   CBC WITH AUTO DIFFERENTIAL - Abnormal; Notable for the following components:       Result Value    Platelets 145 (*)     Neutrophils Absolute 8.0 (*)     All other components within normal limits    Narrative:     Performed at:  [facility]  [address] [place] [address]. [place] [address] [place]   Phone [phone]   COMPREHENSIVE METABOLIC PANEL W/ REFLEX TO MG FOR LOW K    Narrative:     Performed at:  [facility]. [facility]  [address] [place] [address]. [place] [address] [place]   Phone [phone]   TROPONIN    Narrative:     Performed at:  [facility]  [address] [place] [address]. [facility] [place]   Phone [phone]   D-DIMER, QUANTITATIVE    Narrative:     Performed at:  [facility]. [facility]  [address] [place] [address]. [place] [address] [place]   Phone [phone]   HCG, SERUM, QUALITATIVE    Narrative:     Performed at:  [facility]. [facility]  [address] [place] [address]. [facility] [place]   Phone [phone]       All other labs were within normal range or not returned as of this dictation. EKG:  All EKG's are interpreted by the Emergency Department Physician in the absence of a cardiologist.  Please see their note for interpretation of EKG. EKG reviewed by self. Today [phone]. Rate 116 sinus tachycardia there is little bit of right atrial enlargement pattern however she is very thin. Some ST-T wave changes from rate related however again I think she more habitus related than it is ischemic. RADIOLOGY:   Non-plain film images such as CT, Ultrasound and MRI are read by the radiologist. Plain radiographic images are visualized andpreliminarily interpreted by the  ED Provider with the below findings:        Interpretation perthe Radiologist below, if available at the time of this note:    No orders to display     No results found. PROCEDURES   Unless otherwise noted below, none     Procedures    CRITICAL CARE TIME   N/A    CONSULTS:  None      EMERGENCY DEPARTMENT COURSE and DIFFERENTIAL DIAGNOSIS/MDM:   Vitals:    Vitals:    03/13/20 1348 03/13/20 1352   BP: 129/83    Pulse: 130 123   Resp: 20    Temp: 98.7 °F (37.1 °C)    TempSrc: Oral    SpO2: 100%    Weight: 115 lb (52.2 kg)    Height: 5' 3\" (1.6 m)        Patient was given thefollowing medications:  Medications   0.9 % sodium chloride bolus (has no administration in time range)       71-year-old female with palpitations. Heart score low risk. She is not PERC negative because the tachycardia and will contraceptive use though she is Wells low as I will check a dimer. My clinical suspicion of thromboembolic disease is quite limited. We will give her some hydration check some labs pregnancy test etc.      Reviewed workup  Benign  Dimer negative  DC with PCP. FINAL IMPRESSION      1.  Palpitations          DISPOSITION/PLAN   DISPOSITION Decision To Discharge 03/13/2020 03:03:35 PM      PATIENT REFERREDTO:  Antwan Shoemaker MD  2020 [address] [place] [address]  [phone]            DISCHARGE MEDICATIONS:  New Prescriptions    No medications on file       DISCONTINUED MEDICATIONS:  Discontinued Medications    No medications on file

## 2020-05-01 ENCOUNTER — HOSPITAL ENCOUNTER (OUTPATIENT)
Age: 20
Discharge: HOME | End: 2020-05-01
Payer: COMMERCIAL

## 2020-05-01 ENCOUNTER — HOSPITAL ENCOUNTER (OUTPATIENT)
Dept: GENERAL RADIOLOGY | Age: 20
Discharge: HOME | End: 2020-05-01
Payer: COMMERCIAL

## 2020-05-01 PROCEDURE — 72040 X-RAY EXAM NECK SPINE 2-3 VW: CPT

## 2020-05-01 PROCEDURE — 70250 X-RAY EXAM OF SKULL: CPT

## 2020-06-23 ENCOUNTER — OFFICE VISIT (OUTPATIENT)
Dept: ENT CLINIC | Age: 20
End: 2020-06-23
Payer: COMMERCIAL

## 2020-06-23 ENCOUNTER — PROCEDURE VISIT (OUTPATIENT)
Dept: AUDIOLOGY | Age: 20
End: 2020-06-23
Payer: COMMERCIAL

## 2020-06-23 VITALS
BODY MASS INDEX: 20.87 KG/M2 | WEIGHT: 117.8 LBS | HEIGHT: 63 IN | TEMPERATURE: 97.8 F | DIASTOLIC BLOOD PRESSURE: 68 MMHG | SYSTOLIC BLOOD PRESSURE: 104 MMHG | HEART RATE: 95 BPM

## 2020-06-23 PROCEDURE — 92557 COMPREHENSIVE HEARING TEST: CPT | Performed by: AUDIOLOGIST

## 2020-06-23 PROCEDURE — G8427 DOCREV CUR MEDS BY ELIG CLIN: HCPCS | Performed by: OTOLARYNGOLOGY

## 2020-06-23 PROCEDURE — G8420 CALC BMI NORM PARAMETERS: HCPCS | Performed by: OTOLARYNGOLOGY

## 2020-06-23 PROCEDURE — 99202 OFFICE O/P NEW SF 15 MIN: CPT | Performed by: OTOLARYNGOLOGY

## 2020-06-23 PROCEDURE — 92567 TYMPANOMETRY: CPT | Performed by: AUDIOLOGIST

## 2020-06-23 PROCEDURE — 1036F TOBACCO NON-USER: CPT | Performed by: OTOLARYNGOLOGY

## 2020-06-23 RX ORDER — LORAZEPAM 1 MG/1
1 TABLET ORAL EVERY 6 HOURS PRN
COMMUNITY

## 2020-06-23 RX ORDER — ESCITALOPRAM OXALATE 10 MG/1
10 TABLET ORAL DAILY
COMMUNITY

## 2020-06-23 NOTE — PROGRESS NOTES
daily (before meals and nightly) PRN) 120 capsule 3    naproxen (NAPROSYN) 250 MG tablet Take 500 mg by mouth 2 times daily as needed        No current facility-administered medications for this visit. Review of Systems     Review of Systems   Constitutional: Negative for appetite change, chills, fatigue, fever and unexpected weight change. HENT: Positive for hearing loss. Negative for congestion, ear discharge, ear pain, facial swelling, nosebleeds, postnasal drip, sinus pressure, sneezing, sore throat, tinnitus, trouble swallowing and voice change. Eyes: Negative for itching. Respiratory: Negative for apnea, cough and shortness of breath. Gastrointestinal:        Negative for dysphasia   Endocrine: Negative for cold intolerance and heat intolerance. Musculoskeletal: Negative for myalgias and neck pain. Skin: Negative for rash. Allergic/Immunologic: Negative for environmental allergies. Neurological: Negative for dizziness and headaches. Psychiatric/Behavioral: Negative for confusion, decreased concentration and sleep disturbance. PhysicalExam     Vitals:    06/23/20 0836   BP: 104/68   Site: Right Upper Arm   Position: Sitting   Cuff Size: Medium Adult   Pulse: 95   Temp: 97.8 °F (36.6 °C)   TempSrc: Oral   Weight: 117 lb 12.8 oz (53.4 kg)   Height: 5' 3\" (1.6 m)       Physical Exam  Constitutional:       General: She is not in acute distress. Appearance: She is well-developed. HENT:      Head: Normocephalic and atraumatic. Right Ear: Tympanic membrane, ear canal and external ear normal. No drainage. No middle ear effusion. Tympanic membrane is not bulging. Tympanic membrane has normal mobility. Left Ear: Tympanic membrane, ear canal and external ear normal. No drainage. No middle ear effusion. Tympanic membrane is not bulging. Tympanic membrane has normal mobility. Nose: No septal deviation, mucosal edema or rhinorrhea. Mouth/Throat:      Lips: Pink.

## 2020-06-23 NOTE — PROGRESS NOTES
pressure and compliance, Type A tympanogram, consistent with normal middle ear function. Reliability: Good  Transducer: Inserts    See scanned audiogram dated 6/23/2020  for results. PATIENT EDUCATION:       The following items were discussed with the patient:    - Good Communication Strategies    Educational information was shared in the After Visit Summary. RECOMMENDATIONS:                                                                                                                                                                                                                                                          The following items are recommended based on patient report and results from today's appointment:   - Continue medical follow-up with Pina Tadeo DO.   - Retest hearing as medically indicated and/or sooner if a change in hearing is noted. - Utilize \"Good Communication Strategies\" as discussed to assist in speech understanding with communication partners.        Guillermina Pascual  Audiologist    Chart CC'd to: Pina Tadeo DO      Degree of   Hearing Sensitivity dB Range   Within Normal Limits (WNL) 0 - 20   Mild 20 - 40   Moderate 40 - 55   Moderately-Severe 55 - 70   Severe 70 - 90   Profound 90 +

## 2020-06-23 NOTE — Clinical Note
Dr. Shalini Haque,  Thank you for your referral for audiometric testing on this patient. Please see the scanned audiogram (under \"Media\" tab) and encounter note for details. If you have any questions, or if there is anything else you need, please let me know.    Guillermina Gutierrez Audiologist --- [address] [facility] - Audiology

## 2020-06-24 ASSESSMENT — ENCOUNTER SYMPTOMS
FACIAL SWELLING: 0
TROUBLE SWALLOWING: 0
SORE THROAT: 0
COUGH: 0
APNEA: 0
VOICE CHANGE: 0
SHORTNESS OF BREATH: 0
EYE ITCHING: 0
SINUS PRESSURE: 0

## 2021-07-21 ENCOUNTER — OFFICE VISIT (OUTPATIENT)
Dept: OBGYN CLINIC | Age: 21
End: 2021-07-21
Payer: COMMERCIAL

## 2021-07-21 VITALS
SYSTOLIC BLOOD PRESSURE: 110 MMHG | HEART RATE: 101 BPM | WEIGHT: 124.2 LBS | DIASTOLIC BLOOD PRESSURE: 60 MMHG | TEMPERATURE: 98.4 F | BODY MASS INDEX: 22 KG/M2

## 2021-07-21 DIAGNOSIS — N94.6 DYSMENORRHEA: ICD-10-CM

## 2021-07-21 DIAGNOSIS — N90.89 VULVAR LESION: ICD-10-CM

## 2021-07-21 DIAGNOSIS — Z01.419 ENCNTR FOR GYN EXAM (GENERAL) (ROUTINE) W/O ABN FINDINGS: Primary | ICD-10-CM

## 2021-07-21 DIAGNOSIS — Z12.4 PAP SMEAR FOR CERVICAL CANCER SCREENING: ICD-10-CM

## 2021-07-21 PROCEDURE — 99395 PREV VISIT EST AGE 18-39: CPT | Performed by: OBSTETRICS & GYNECOLOGY

## 2021-07-21 RX ORDER — VILAZODONE HYDROCHLORIDE 20 MG/1
20 TABLET ORAL DAILY
COMMUNITY

## 2021-07-21 ASSESSMENT — ENCOUNTER SYMPTOMS
VOMITING: 0
SORE THROAT: 0
COUGH: 0
NAUSEA: 0
DIARRHEA: 0
CONSTIPATION: 0
ABDOMINAL PAIN: 0
SHORTNESS OF BREATH: 0

## 2021-07-21 NOTE — PROGRESS NOTES
Annual Exam      CC:   Chief Complaint   Patient presents with    Gynecologic Exam       HPI:  24 y.o. Yaw Downey presents for annual exam    Patient seen and examined. Patient is overall doing well today. Patient stopped OCPs 2019 and is doing okay. Menses are regular, occurring monthly. Last for 4-7 days. Reports menses are on the heavier side, changing pads/tampons 5-6 times per day. Reports cramping with menses is manageable. Reports symptoms of anxiety/depression. Working with Psychiatrist for such. Reports a left labial lesion, dark in color, raised. Denies pain or tenderness. Denies bleeding or drainage. Health Maintenance:  Birth control: Condoms  Pregnancy plans: None currently  Safe relationship: Together 5 years    Screening:  Last pap smear: Has not had  History of abnormal pap smears: N/A  STI screenin2018    Review of Systems:   Review of Systems   Constitutional: Negative for chills and fever. HENT: Negative for congestion, sneezing and sore throat. Respiratory: Negative for cough and shortness of breath. Cardiovascular: Negative for chest pain and palpitations. Gastrointestinal: Negative for abdominal pain, constipation, diarrhea, nausea and vomiting. Genitourinary: Negative for dysuria, frequency, menstrual problem, pelvic pain and vaginal discharge. Left labial lesion   Musculoskeletal: Negative. Allergic/Immunologic: Negative for environmental allergies. Neurological: Negative for dizziness and headaches. Psychiatric/Behavioral: The patient is nervous/anxious (working with psychiatry).     Breast: Denies skin changes, nipple discharge, lesions, dimpling, tenderness or palpable masses     Primary Care Physician: Eleanor Armando MD    Obstetric History  OB History    Para Term  AB Living   0 0 0 0 0 0   SAB TAB Ectopic Molar Multiple Live Births   0 0 0 0 0 0       GynecologicHistory  Menstrual History:   LMP: Patient's last menstrual period was 06/28/2021 (approximate).  Age of Menarche: 7   Menstrual Period: regular   Interval Between Menses: Monthly   Duration of Menses: 4-8 days   Menstrual Flow: Moderate to heavy   Painful Menses: Yes - doing well currently   Bleeding between menses: Denies    Sexual History:   Contraception: see above   Currently is sexually active   3 Lifetime partners   Denies historyof STIs   Denies sexual problems    Pap History:   History of abnormal pap smears: see above   Last pap: see above      Medical History:  Past Medical History:   Diagnosis Date    Depression        Medications:  Current Outpatient Medications   Medication Sig Dispense Refill    vilazodone HCl (VILAZODONE HCL) 20 MG TABS Take 20 mg by mouth daily      LORazepam (ATIVAN) 1 MG tablet Take 1 mg by mouth every 6 hours as needed for Anxiety.  naproxen (NAPROSYN) 250 MG tablet Take 500 mg by mouth 2 times daily as needed       escitalopram (LEXAPRO) 10 MG tablet Take 10 mg by mouth daily (Patient not taking: Reported on 7/21/2021)      dicyclomine (BENTYL) 10 MG capsule Take 1 capsule by mouth 4 times daily (before meals and nightly) (Patient taking differently: Take 10 mg by mouth 4 times daily (before meals and nightly) PRN) 120 capsule 3     No current facility-administered medications for this visit. Surgical History:  History reviewed. No pertinent surgical history. Allergies: Allergies   Allergen Reactions    Cocoa Butter Hives    Amoxicillin     Pcn [Penicillins]        Family History:  Family History   Adopted: Yes       Denies personal/family history ofcervical, uterine, ovarian, vulvar, breast, or colon cancers.   Denies personal/family history of bleeding orclotting disorders  Denies personal/family history of genetic disorders    Social History:  Social History     Socioeconomic History    Marital status: Single     Spouse name: None    Number of children: None    Years of education: None    Highest education level: None   Occupational History    None   Tobacco Use    Smoking status: Former Smoker     Packs/day: 0.25     Years: 1.00     Pack years: 0.25     Types: Cigarettes     Quit date: 2019     Years since quittin.0    Smokeless tobacco: Never Used   Vaping Use    Vaping Use: Never used   Substance and Sexual Activity    Alcohol use: Yes     Comment: occ    Drug use: Never    Sexual activity: Yes     Partners: Male   Other Topics Concern    None   Social History Narrative    None     Social Determinants of Health     Financial Resource Strain:     Difficulty of Paying Living Expenses:    Food Insecurity:     Worried About Running Out of Food in the Last Year:     [address] in the Last Year:    Transportation Needs:     Lack of Transportation (Medical):  Lack of Transportation (Non-Medical):    Physical Activity:     Days of Exercise per Week:     Minutes of Exercise per Session:    Stress:     Feeling of Stress :    Social Connections:     Frequency of Communication with Friends and Family:     Frequency of Social Gatherings with Friends and Family:     Attends Temple Services:     Active Member of Clubs or Organizations:     Attends Club or Organization Meetings:     Marital Status:    Intimate Partner Violence:     Fear of Current or Ex-Partner:     Emotionally Abused:     Physically Abused:     Sexually Abused:        Objective: Body mass index is 22 kg/m². /60 (Site: Left Upper Arm, Position: Sitting, Cuff Size: Medium Adult)   Pulse 101   Temp 98.4 °F (36.9 °C) (Infrared)   Wt 124 lb 3.2 oz (56.3 kg)   LMP 2021 (Approximate)   BMI 22.00 kg/m²     Exam:   Physical Exam  Vitals reviewed. Exam conducted with a chaperone present. Constitutional:       General: She is not in acute distress. Appearance: She is well-developed. HENT:      Head: Normocephalic and atraumatic.    Eyes:      Conjunctiva/sclera: Conjunctivae normal.   Neck:      Thyroid: No thyromegaly. Trachea: No tracheal deviation. Cardiovascular:      Rate and Rhythm: Normal rate. Pulmonary:      Effort: Pulmonary effort is normal. No respiratory distress. Chest:      Breasts:         Right: No swelling, mass, nipple discharge, skin change or tenderness. Left: No mass, nipple discharge, skin change or tenderness. Abdominal:      General: There is no distension. Palpations: Abdomen is soft. There is no mass. Tenderness: There is no abdominal tenderness. There is no guarding or rebound. Genitourinary:     General: Normal vulva. Exam position: Lithotomy position. Labia:         Right: No rash, tenderness or lesion. Left: No rash, tenderness or lesion. Vagina: No signs of injury. No vaginal discharge, erythema, tenderness, bleeding or lesions. Cervix: No cervical motion tenderness, discharge, friability, lesion, erythema or cervical bleeding. Uterus: Not deviated, not enlarged, not fixed and not tender. Adnexa:         Right: No mass, tenderness or fullness. Left: No mass, tenderness or fullness. Musculoskeletal:         General: No swelling. Cervical back: Normal range of motion. Skin:     General: Skin is warm and dry. Findings: No erythema. Neurological:      Mental Status: She is alert and oriented to person, place, and time. Psychiatric:         Mood and Affect: Mood normal.         Behavior: Behavior normal.         Thought Content: Thought content normal.         Assessment/Plan:  24 y.o.  presenting for annual exam    1.  Encntr for gyn exam (general) (routine) w/o abn findings     - Pap smear collected today - will call with results     - Age based screening recommendations discussed     - Self breast exams/awareness discussed with the patient     - Healthy lifestyle habits discussed including calcium and vitamin D supplementation     - Will follow-up in 1 year for annual exam     2. Pap smear for cervical cancer screening     - Pap smear collected today - will call with results     - Age based screening recommendations discussed    3. Vulvar lesion     - Benign appearing skin tag     - Reviewed if becomes tender, painful or has any bleeding. If changes in size, shape or color will have back for office excision.     4. Dysmenorrhea     - Currently doing well without complaints off of OCPs     - Risks, benefits and alternatives were reviewed     - If symptoms return or worsen okay to restart OCPs ([place] prior)      Charisse Smalls,

## 2022-02-22 ENCOUNTER — OFFICE VISIT (OUTPATIENT)
Dept: OBGYN CLINIC | Age: 22
End: 2022-02-22
Payer: COMMERCIAL

## 2022-02-22 VITALS
HEART RATE: 108 BPM | SYSTOLIC BLOOD PRESSURE: 110 MMHG | DIASTOLIC BLOOD PRESSURE: 70 MMHG | TEMPERATURE: 98 F | BODY MASS INDEX: 19.38 KG/M2 | WEIGHT: 109.4 LBS

## 2022-02-22 DIAGNOSIS — Z30.09 ENCOUNTER FOR OTHER GENERAL COUNSELING AND ADVICE ON CONTRACEPTION: Primary | ICD-10-CM

## 2022-02-22 DIAGNOSIS — N90.89 LABIAL LESION: ICD-10-CM

## 2022-02-22 PROCEDURE — 56605 BIOPSY OF VULVA/PERINEUM: CPT | Performed by: OBSTETRICS & GYNECOLOGY

## 2022-02-22 NOTE — PROGRESS NOTES
Return Gyn Office Visit    CC:   Chief Complaint   Patient presents with    Procedure       HPI:  Saulo London is a 24 y.o. female who presents for labial lesion excision. Patient is seen and examined today. Reports labial lesion has become increasingly bothersome. Getting caught on underwear and is frequently tender. Denies bleeding episodes. Patient reports routine condom use, however had one time that she did not use. Following this she took Plan B a week ago. Has had some light spotting since. Reports menses have been regular with menses generally at the end of the month. Is not sure where she was in her cycle. Is not interested in OCPs at this time. Denies chest pain, shortness of breath, fever, chills, nausea, vomiting. Review of Systems - The following ROS was otherwise negative, except as noted in the HPI: constitutional, respiratory, cardiovascular, gastrointestinal, genitourinary    Objective:  /70 (Site: Left Upper Arm, Position: Sitting, Cuff Size: Medium Adult)   Pulse 108   Temp 98 °F (36.7 °C) (Infrared)   Wt 109 lb 6.4 oz (49.6 kg)   BMI 19.38 kg/m²     Physical Exam  Vitals reviewed. Exam conducted with a chaperone present. Constitutional:       General: She is not in acute distress. Appearance: She is well-developed. HENT:      Head: Normocephalic and atraumatic. Eyes:      Conjunctiva/sclera: Conjunctivae normal.   Cardiovascular:      Rate and Rhythm: Normal rate. Pulmonary:      Effort: Pulmonary effort is normal. No respiratory distress. Abdominal:      General: There is no distension. Palpations: Abdomen is soft. Tenderness: There is no abdominal tenderness. There is no guarding or rebound. Genitourinary:      Musculoskeletal:         General: No swelling. Skin:     General: Skin is warm and dry. Neurological:      Mental Status: She is alert and oriented to person, place, and time.    Psychiatric:         Mood and Affect: Mood normal.         Behavior: Behavior normal.         Thought Content: Thought content normal.         Assessment/Plan:     Leisa Romero is a 24 y.o. female who presents for labial lesion excision. 1. Encounter for other general counseling and advice on contraception     - Patient with recent use of plan B     - Reports mild spotting, no full menses yet     - Risks, benefits and alternatives were reviewed with the patient     - Declines alternative to condoms at this time     - Menstrual physiology reviewed with the patient and timing to avoid pregnancy reviewed     - Reviewed importance of using an tracking method if not consistently using condoms if does not desire conception    2.  Labial lesion     - Increasingly irritated with clothing and shaving     - Excised in office - will call with results    Will follow-up for annual exam or prn       Arlena Kanner, DO

## 2022-02-22 NOTE — PROGRESS NOTES
Skin Biopsy   Procedure Note     PRE-OP DIAGNOSIS: left labial lesion     POST-OP DIAGNOSIS: Same     PROCEDURE: Labial biopsy     Performing Physician: EDIS Blair DO     Indication:   Moody Mckeon is a 24 y.o. female with a hyperpigmented raised lesion of the left labia. Has become increasingly painful and irritated by clothing and shaving. Desires excision today. Risks, benefits and alternatives were reviewed. Consent signed and in chart. PROCEDURE Description:  The area surrounding the skin lesion was preped and draped in the usual sterile manner with betadine swabs. 1 % local anesthetic was injected into region. The lesion was grasped with pickups and excised along the base using a scalpel. Specimen was then sent to pathology. Incision site was closed in a running fashion using 3-0 Vicryl. Triple antibiotic ointment was applied. The patient tolerated the procedure well. Closure: 3-0 Vicryl    Specimen: Left labial biopsy    Followup: The patient tolerated the procedure well without complications. Standard post-procedure care is explained and return precautions are given. OTC analgesics for pain control. Will call patient with biopsy results.     Marielos Mata DO

## 2022-09-02 ENCOUNTER — OFFICE VISIT (OUTPATIENT)
Dept: ENT CLINIC | Age: 22
End: 2022-09-02
Payer: COMMERCIAL

## 2022-09-02 ENCOUNTER — TELEPHONE (OUTPATIENT)
Dept: ENT CLINIC | Age: 22
End: 2022-09-02

## 2022-09-02 VITALS — BODY MASS INDEX: 19.67 KG/M2 | TEMPERATURE: 97.5 F | WEIGHT: 111 LBS | HEIGHT: 63 IN

## 2022-09-02 DIAGNOSIS — H61.21 IMPACTED CERUMEN OF RIGHT EAR: ICD-10-CM

## 2022-09-02 DIAGNOSIS — H69.81 DYSFUNCTION OF RIGHT EUSTACHIAN TUBE: Primary | ICD-10-CM

## 2022-09-02 PROCEDURE — G8427 DOCREV CUR MEDS BY ELIG CLIN: HCPCS | Performed by: OTOLARYNGOLOGY

## 2022-09-02 PROCEDURE — 99203 OFFICE O/P NEW LOW 30 MIN: CPT | Performed by: OTOLARYNGOLOGY

## 2022-09-02 PROCEDURE — G8420 CALC BMI NORM PARAMETERS: HCPCS | Performed by: OTOLARYNGOLOGY

## 2022-09-02 PROCEDURE — 1036F TOBACCO NON-USER: CPT | Performed by: OTOLARYNGOLOGY

## 2022-09-02 RX ORDER — FLUTICASONE PROPIONATE 50 MCG
2 SPRAY, SUSPENSION (ML) NASAL DAILY
Qty: 16 G | Refills: 3 | Status: SHIPPED | OUTPATIENT
Start: 2022-09-02

## 2022-09-02 ASSESSMENT — ENCOUNTER SYMPTOMS
EYE ITCHING: 0
FACIAL SWELLING: 0
COUGH: 0
TROUBLE SWALLOWING: 0
SORE THROAT: 0
VOICE CHANGE: 0
APNEA: 0
SINUS PRESSURE: 0
SHORTNESS OF BREATH: 0

## 2022-09-02 NOTE — TELEPHONE ENCOUNTER
Patient calling to let  know her ear has started bleeding after her appointment this morning an is painful.   She is worried she will have an ear infection, Please advise    Patient phone number [phone]

## 2022-09-02 NOTE — TELEPHONE ENCOUNTER
Call placed to patient to inform her of the message from Dr. Celia Lowe. \"She will not get an ear infection. There was a small amount of trauma to the canal wall when she jumped during suctioning. This will heal without issue. \"    Patient expressed understanding.

## 2022-09-02 NOTE — PROGRESS NOTES
Michael Arzola 89, [address], [address] [place] [address]  P: [phone]       Patient     Lilian Esparza  2000    ChiefComplaint     Chief Complaint   Patient presents with    Cerumen Impaction     States she has wax in her ears    Ear Problem     Has popping I her ears, states just got her wisdom teeth out a year ago. History of Present Illness     [facility] is a 76-year-old female here today for evaluation of intermittent right ear popping. States she got a camera at home and saw a piece of glass on her eardrum. Left ear normal.  No history of ear surgery. Denies otalgia, otorrhea. Past Medical History     Past Medical History:   Diagnosis Date    Depression        Past Surgical History     History reviewed. No pertinent surgical history. Family History     Family History   Adopted: Yes       Social History     Social History     Tobacco Use    Smoking status: Former     Packs/day: 0.25     Years: 1.00     Pack years: 0.25     Types: Cigarettes     Quit date: 6/23/2019     Years since quitting: 3.1    Smokeless tobacco: Never   Vaping Use    Vaping Use: Never used   Substance Use Topics    Alcohol use: Yes     Comment: occ    Drug use: Never        Allergies     Allergies   Allergen Reactions    Cocoa Butter Hives    Amoxicillin     Pcn [Penicillins]        Medications     Current Outpatient Medications   Medication Sig Dispense Refill    fluticasone (FLONASE) 50 MCG/ACT nasal spray 2 sprays by Each Nostril route daily 16 g 3    vilazodone HCl (VIIBRYD) 20 MG TABS Take 20 mg by mouth daily      LORazepam (ATIVAN) 1 MG tablet Take 1 mg by mouth every 6 hours as needed for Anxiety.   (Patient not taking: Reported on 9/2/2022)      escitalopram (LEXAPRO) 10 MG tablet Take 10 mg by mouth daily (Patient not taking: No sig reported)      dicyclomine (BENTYL) 10 MG capsule Take 1 capsule by mouth 4 times daily (before meals and nightly) (Patient not taking: No sig reported) 120 capsule 3    naproxen (NAPROSYN) 250 MG tablet Take 500 mg by mouth 2 times daily as needed  (Patient not taking: Reported on 9/2/2022)       No current facility-administered medications for this visit. Review of Systems     Review of Systems   Constitutional:  Negative for appetite change, chills, fatigue, fever and unexpected weight change. HENT:  Negative for congestion, ear discharge, ear pain, facial swelling, hearing loss, nosebleeds, postnasal drip, sinus pressure, sneezing, sore throat, tinnitus, trouble swallowing and voice change. Eyes:  Negative for itching. Respiratory:  Negative for apnea, cough and shortness of breath. Endocrine: Negative for cold intolerance and heat intolerance. Musculoskeletal:  Negative for myalgias and neck pain. Skin:  Negative for rash. Allergic/Immunologic: Negative for environmental allergies. Neurological:  Negative for dizziness and headaches. Psychiatric/Behavioral:  Negative for confusion, decreased concentration and sleep disturbance. PhysicalExam     Vitals:    09/02/22 1121   Temp: 97.5 °F (36.4 °C)   TempSrc: Infrared   Weight: 111 lb (50.3 kg)   Height: 5' 3\" (1.6 m)       Constitutional:       Appearance: Normal appearance. HENT:      Head: Normocephalic. Nose: Nose normal.      Ears: Small aroldo of black suctioned off of right TM. TM intact middle ear clear. No movement with pneumatic otoscopy. Left EAC clear. TM intact with middle ear clear. Mobile with pneumatic otoscopy  Eyes:      Extraocular Movements: Extraocular movements intact. Neck:      Musculoskeletal: Normal range of motion. Neurological:      General: No focal deficit present. Mental Status: She is alert and oriented to person, place, and time. Psychiatric:         Mood and Affect: Mood normal.         Behavior: Behavior normal.         Thought Content: Thought content normal.         Assessment and Plan     1.  Dysfunction of right eustachian

## 2022-10-24 ENCOUNTER — TELEPHONE (OUTPATIENT)
Dept: OBGYN CLINIC | Age: 22
End: 2022-10-24

## 2022-10-24 NOTE — TELEPHONE ENCOUNTER
Pt requesting appointment to have some hormone levels checked and would also like STI testing. She says she has had unprotected sex. She is not symptomatic but wants to be checked.  Please advise

## 2022-12-06 ENCOUNTER — OFFICE VISIT (OUTPATIENT)
Dept: OBGYN CLINIC | Age: 22
End: 2022-12-06
Payer: COMMERCIAL

## 2022-12-06 VITALS
HEIGHT: 63 IN | SYSTOLIC BLOOD PRESSURE: 112 MMHG | WEIGHT: 112 LBS | HEART RATE: 85 BPM | TEMPERATURE: 98.6 F | BODY MASS INDEX: 19.84 KG/M2 | DIASTOLIC BLOOD PRESSURE: 78 MMHG

## 2022-12-06 DIAGNOSIS — Z01.419 ENCNTR FOR GYN EXAM (GENERAL) (ROUTINE) W/O ABN FINDINGS: Primary | ICD-10-CM

## 2022-12-06 DIAGNOSIS — Z12.4 PAP SMEAR FOR CERVICAL CANCER SCREENING: ICD-10-CM

## 2022-12-06 DIAGNOSIS — N92.6 IRREGULAR MENSES: ICD-10-CM

## 2022-12-06 DIAGNOSIS — N90.89 VULVAR LESION: ICD-10-CM

## 2022-12-06 DIAGNOSIS — Z20.2 POSSIBLE EXPOSURE TO STD: ICD-10-CM

## 2022-12-06 DIAGNOSIS — R89.9 ABNORMAL LABORATORY TEST: ICD-10-CM

## 2022-12-06 PROCEDURE — 99395 PREV VISIT EST AGE 18-39: CPT | Performed by: OBSTETRICS & GYNECOLOGY

## 2022-12-06 PROCEDURE — G8484 FLU IMMUNIZE NO ADMIN: HCPCS | Performed by: OBSTETRICS & GYNECOLOGY

## 2022-12-06 PROCEDURE — 36415 COLL VENOUS BLD VENIPUNCTURE: CPT | Performed by: OBSTETRICS & GYNECOLOGY

## 2022-12-07 LAB
CANDIDA SPECIES, DNA PROBE: NORMAL
ESTRADIOL LEVEL: 144 PG/ML
FOLLICLE STIMULATING HORMONE: 4.2 MIU/ML
GARDNERELLA VAGINALIS, DNA PROBE: NORMAL
HIV AG/AB: NORMAL
HIV ANTIGEN: NORMAL
HIV-1 ANTIBODY: NORMAL
HIV-2 AB: NORMAL
LUTEINIZING HORMONE: 9.3 MIU/ML
PROLACTIN: 28.5 NG/ML
T4 FREE: 1.1 NG/DL (ref 0.9–1.8)
TRICHOMONAS VAGINALIS DNA: NORMAL
TSH REFLEX: 4.62 UIU/ML (ref 0.27–4.2)

## 2022-12-10 LAB — 17-OH PROGESTERONE LCMS: 262.45 NG/DL

## 2022-12-13 LAB
C TRACH DNA GENITAL QL NAA+PROBE: NEGATIVE
N. GONORRHOEAE DNA: NEGATIVE

## 2022-12-19 ENCOUNTER — TELEPHONE (OUTPATIENT)
Dept: OBGYN CLINIC | Age: 22
End: 2022-12-19

## 2022-12-19 NOTE — TELEPHONE ENCOUNTER
Patient calling regarding her referral to [facility] , they called her and told patient they can't schedule her until they get more information. Mojgan sent lab results with referral last week  but they need her notes from last visit. Please advise when last note is completed so we can send again.      Thank you KP

## 2022-12-20 PROBLEM — N90.89 VULVAR LESION: Status: ACTIVE | Noted: 2022-12-20

## 2022-12-20 PROBLEM — N92.6 IRREGULAR MENSES: Status: ACTIVE | Noted: 2022-12-20

## 2022-12-20 ASSESSMENT — ENCOUNTER SYMPTOMS
SHORTNESS OF BREATH: 0
NAUSEA: 0
ABDOMINAL PAIN: 0
DIARRHEA: 0
VOMITING: 0
COUGH: 0
SORE THROAT: 0
CONSTIPATION: 0

## 2022-12-20 NOTE — PROGRESS NOTES
Annual Exam      CC:   Chief Complaint   Patient presents with    Annual Exam       HPI:  20 y.o.  presents for annual exam    Patient seen and examined. Patient is overall doing well today. Patient reports she is no longer with her long term partner. Has a new partner and would like STI testing. Reports menses are mostly regular, occurring every 4-5 weeks. Lasting for 4-8 days. On heaviest day she changes tampons every 1.5 hours, reports passing small clots. Denies pain with menses. Denies chest pain, shortness of breath, fever, chills, nausea, vomiting. Denies tobacco use. Denies migraines with aura. Denies strong known family history of breast cancer or VTE. Health Maintenance:  Birth control: Condoms  Pregnancy plans: None currently  Safe relationship: Single    Screening:  Last pap smear: 20 - NILM  History of abnormal pap smears: Denies  STI screenin2018    Review of Systems:   Review of Systems   Constitutional:  Negative for chills and fever. HENT:  Negative for congestion, sneezing and sore throat. Respiratory:  Negative for cough and shortness of breath. Cardiovascular:  Negative for chest pain and palpitations. Gastrointestinal:  Negative for abdominal pain, constipation, diarrhea, nausea and vomiting. Genitourinary:  Positive for menstrual problem. Negative for dysuria, frequency, pelvic pain and vaginal discharge. Left labial lesion   Musculoskeletal: Negative. Allergic/Immunologic: Negative for environmental allergies. Neurological:  Negative for dizziness and headaches. Psychiatric/Behavioral:  The patient is not nervous/anxious.     Breast: Denies skin changes, nipple discharge, lesions, dimpling, tenderness or palpable masses     Primary Care Physician: Theresa Mckinney MD    Obstetric History  OB History    Para Term  AB Living   0 0 0 0 0 0   SAB IAB Ectopic Molar Multiple Live Births   0 0 0 0 0 0 GynecologicHistory  Menstrual History:  LMP: Patient's last menstrual period was 11/17/2022 (exact date). Age of Menarche: 9  Menstrual Period: irregular  Interval Between Menses: Every 4-5 weeks  Duration of Menses: 4-8 days  Menstrual Flow: Moderate to heavy  Painful Menses: Yes - doing well currently  Bleeding between menses: Denies    Sexual History:  Contraception: see above  Currently is sexually active  3 Lifetime partners  Denies historyof STIs  Denies sexual problems    Pap History:  History of abnormal pap smears: see above  Last pap: see above      Medical History:  Past Medical History:   Diagnosis Date    Depression        Medications:  Current Outpatient Medications   Medication Sig Dispense Refill    fluticasone (FLONASE) 50 MCG/ACT nasal spray 2 sprays by Each Nostril route daily 16 g 3    vilazodone HCl (VIIBRYD) 20 MG TABS Take 20 mg by mouth daily      LORazepam (ATIVAN) 1 MG tablet Take 1 mg by mouth every 6 hours as needed for Anxiety. (Patient not taking: No sig reported)      naproxen (NAPROSYN) 250 MG tablet Take 500 mg by mouth 2 times daily as needed  (Patient not taking: No sig reported)       No current facility-administered medications for this visit. Surgical History:  History reviewed. No pertinent surgical history. Allergies: Allergies   Allergen Reactions    Cocoa Butter Hives    Amoxicillin     Pcn [Penicillins]        Family History:  Family History   Adopted: Yes       Denies personal/family history ofcervical, uterine, ovarian, vulvar, breast, or colon cancers.   Denies personal/family history of bleeding orclotting disorders  Denies personal/family history of genetic disorders    Social History:  Social History     Socioeconomic History    Marital status: Single     Spouse name: None    Number of children: None    Years of education: None    Highest education level: None   Tobacco Use    Smoking status: Former     Packs/day: 0.25     Years: 1.00     Pack years: 0.25     Types: Cigarettes     Quit date: 6/23/2019     Years since quitting: 3.4    Smokeless tobacco: Never   Vaping Use    Vaping Use: Never used   Substance and Sexual Activity    Alcohol use: Yes     Comment: occ    Drug use: Never    Sexual activity: Yes     Partners: Male       Objective: Body mass index is 19.84 kg/m². /78 (Site: Left Upper Arm, Position: Sitting, Cuff Size: Medium Adult)   Pulse 85   Temp 98.6 °F (37 °C) (Infrared)   Ht 5' 3\" (1.6 m)   Wt 112 lb (50.8 kg)   LMP 11/17/2022 (Exact Date)   BMI 19.84 kg/m²     Exam:   Physical Exam  Vitals reviewed. Exam conducted with a chaperone present. Constitutional:       General: She is not in acute distress. Appearance: She is well-developed. HENT:      Head: Normocephalic and atraumatic. Eyes:      Conjunctiva/sclera: Conjunctivae normal.   Neck:      Thyroid: No thyromegaly. Cardiovascular:      Rate and Rhythm: Normal rate. Pulmonary:      Effort: Pulmonary effort is normal. No respiratory distress. Chest:   Breasts:     Right: No mass, nipple discharge, skin change or tenderness. Left: No mass, nipple discharge, skin change or tenderness. Abdominal:      General: There is no distension. Palpations: Abdomen is soft. There is no mass. Tenderness: There is no abdominal tenderness. There is no guarding or rebound. Genitourinary:     General: Normal vulva. Exam position: Lithotomy position. Labia:         Right: No rash, tenderness or lesion. Left: No rash, tenderness or lesion. Vagina: No signs of injury. Vaginal discharge present. No erythema, tenderness, bleeding or lesions. Cervix: Friability present. No cervical motion tenderness, discharge, lesion, erythema or cervical bleeding. Uterus: Not deviated, not enlarged and not tender. Adnexa:         Right: No mass, tenderness or fullness. Left: No mass, tenderness or fullness.         Musculoskeletal: General: No swelling. Cervical back: Normal range of motion and neck supple. Skin:     General: Skin is warm and dry. Neurological:      Mental Status: She is alert and oriented to person, place, and time. Psychiatric:         Mood and Affect: Mood normal.         Behavior: Behavior normal.         Thought Content: Thought content normal.     Assessment/Plan:  20 y.o.  presenting for annual exam    1. Encntr for gyn exam (general) (routine) w/o abn findings     - Pap smear collected today - will call with results     - Age based screening recommendations discussed     - Self breast exams/awareness discussed with the patient     - Healthy lifestyle habits discussed      - Will follow-up in 1 year for annual exam    2. Pap smear for cervical cancer screening     - Pap smear collected today - will call with results     - Age based screening recommendations discussed    3. Irregular menses     - Patient with menses every 4-5 weeks, lasting for 4-8 days and heavy in flow    - Differential reviewed with the patient including benign and neoplastic conditions     - Labs ordered for further evaluation  - Luteinizing Hormone  - Follicle Stimulating Hormone  - 17-HYDROXYPROGESTERONE  - TSH with Reflex  - Estradiol  - Prolactin     - Risks, benefits and alternatives were reviewed     - Does not desire hormonal intervention at this time     - Continue NSAIDs for bleeding and hx of dysmenorrhea     - Will follow-up with lab results      - Will follow-up in office with US for further evaluation    4. Possible exposure to STD     - Recently out of a long term relationship and desires testing     - Cultures and blood work obtained, declined HepC  - C.trachomatis N.gonorrhoeae DNA  - Giovannimouth     - Condoms for STI prevention     - Return precautions reviewed     5. Vulvar lesion     - Benign appearing skin tag     - Reviewed if becomes tender, painful or has any bleeding.   If changes in size, shape or color will have back for office excision.     Brenda Garcia, DO

## 2022-12-20 NOTE — TELEPHONE ENCOUNTER
Note is completed. However the referral is based on her lab results so please make sure those go as well as the result note. Thank you.

## 2023-01-24 ENCOUNTER — OFFICE VISIT (OUTPATIENT)
Dept: OBGYN CLINIC | Age: 23
End: 2023-01-24
Payer: COMMERCIAL

## 2023-01-24 VITALS
HEART RATE: 102 BPM | SYSTOLIC BLOOD PRESSURE: 106 MMHG | DIASTOLIC BLOOD PRESSURE: 68 MMHG | TEMPERATURE: 97.8 F | BODY MASS INDEX: 19.63 KG/M2 | WEIGHT: 110.8 LBS

## 2023-01-24 DIAGNOSIS — N92.6 IRREGULAR MENSES: Primary | ICD-10-CM

## 2023-01-24 DIAGNOSIS — N94.6 DYSMENORRHEA: ICD-10-CM

## 2023-01-24 PROCEDURE — 1036F TOBACCO NON-USER: CPT | Performed by: OBSTETRICS & GYNECOLOGY

## 2023-01-24 PROCEDURE — 99212 OFFICE O/P EST SF 10 MIN: CPT | Performed by: OBSTETRICS & GYNECOLOGY

## 2023-01-24 PROCEDURE — G8484 FLU IMMUNIZE NO ADMIN: HCPCS | Performed by: OBSTETRICS & GYNECOLOGY

## 2023-01-24 PROCEDURE — G8420 CALC BMI NORM PARAMETERS: HCPCS | Performed by: OBSTETRICS & GYNECOLOGY

## 2023-01-24 PROCEDURE — G8427 DOCREV CUR MEDS BY ELIG CLIN: HCPCS | Performed by: OBSTETRICS & GYNECOLOGY

## 2023-01-24 PROCEDURE — 36415 COLL VENOUS BLD VENIPUNCTURE: CPT | Performed by: OBSTETRICS & GYNECOLOGY

## 2023-01-25 NOTE — PROGRESS NOTES
Return Gyn Office Visit    CC:   Chief Complaint   Patient presents with    Other       HPI:  Pato Laird is a 20 y.o. female who presents for US follow-up of irregular menses. Patient is seen and examined today. Patient is overall doing well. Patient with a history of irregular menses, occurring every 4-5 weeks. Last for 4-7 days and is changing pads/tampons 5 times a day. Denies tobacco use. Denies history of migraine with aura. Denies chest pain, shortness of breath, fever, chills, nausea, vomiting. Has new patient visit with endocrine in March. Review of Systems - The following ROS was otherwise negative, except as noted in the HPI: constitutional, respiratory, cardiovascular, gastrointestinal, genitourinary    Objective:  /68 (Site: Left Upper Arm, Position: Sitting, Cuff Size: Medium Adult)   Pulse (!) 102   Temp 97.8 °F (36.6 °C) (Infrared)   Wt 110 lb 12.8 oz (50.3 kg)   LMP 01/20/2023 (Exact Date)   BMI 19.63 kg/m²     Physical Exam  Vitals reviewed. Constitutional:       General: She is not in acute distress. Appearance: She is well-developed. HENT:      Head: Normocephalic and atraumatic. Eyes:      Conjunctiva/sclera: Conjunctivae normal.   Cardiovascular:      Rate and Rhythm: Normal rate. Pulmonary:      Effort: Pulmonary effort is normal. No respiratory distress. Abdominal:      General: There is no distension. Palpations: Abdomen is soft. Tenderness: There is no abdominal tenderness. There is no guarding or rebound. Musculoskeletal:         General: No swelling. Skin:     General: Skin is warm and dry. Neurological:      Mental Status: She is alert and oriented to person, place, and time. Psychiatric:         Mood and Affect: Mood normal.         Behavior: Behavior normal.         Thought Content:  Thought content normal.       Ultrasound  PELVIC ULTRASOUND without DOPPLER INTERROGATION   NON OB     DATE: 01/24/2023 PHYSICIAN: EDIS Gann.      SONOGRAPHER: Carla Calzada [facility]     INDICATION: abnormal uterine bleeding     TYPE OF SCAN: vaginal     FINDINGS:    The cul de sac is normal. No free fluid appreciated. The cervix is normal and not enlarged. Nabothian cyst/s is not noted within the uterine cervix. The uterus measures 6.98 cm x 5.22 cm x 3.51 cm. The uterus is anteverted. The endometrium measures 2.68 mm. The myometrium is homogeneous in appearance. No uterine anomalies are noted. The right ovary is present. The right ovary measures 3.54 cm x 1.58 cm x 1.25 cm. Ovary findings: No masses seen. The right adnexa is normal.    The left ovary is present. The left ovary measures 3.98 cm x 1.64 cm x 2.61 cm. Ovary findings: No masses seen. The left adnexa is normal.        IMPRESSION: Normal uterus. Normal right ovary. Normal left ovary. Imaging is limited secondary to bowel gas. The patient is well aware of the limitations of ultrasound in the detection of anomalies of the abdomen and pelvis. Assessment/Plan:     Dallin Wilkinson is a 20 y.o. female who presents for US follow-up of irregular menses. 1. Irregular menses     - Patient with menses every 4-5 weeks, lasting for 4-8 days and heavy in flow     - Differential reviewed with the patient including benign and neoplastic conditions     - Labs with elevated TSH and 17 (OH) P - has appt with endocrinology in March       - US today with normal appearing uterus with ES 2.68mm and normal appearing bilateral ovaries. - Risks, benefits and alternatives were reviewed     - Does not desire hormonal intervention at this time     - Continue NSAIDs for bleeding and dysmenorrhea     - Testosterone was not completed with initial labs  - collected today   - Testosterone, free, total     - Will follow-up after endocrinology appt     - Return precautions reviewed    2.  Dysmenorrhea       Lucero Juan DO

## 2023-01-26 LAB
SEX HORMONE BINDING GLOBULIN: 48 NMOL/L (ref 30–135)
TESTOSTERONE FREE-NONMALE: 3 PG/ML (ref 0.8–7.4)
TESTOSTERONE TOTAL: 21 NG/DL (ref 20–70)

## 2023-02-03 ENCOUNTER — TELEPHONE (OUTPATIENT)
Dept: OBGYN CLINIC | Age: 23
End: 2023-02-03

## 2023-02-03 DIAGNOSIS — L70.9 ACNE, UNSPECIFIED ACNE TYPE: Primary | ICD-10-CM

## 2023-02-03 NOTE — TELEPHONE ENCOUNTER
I do not prescribe acne medication outside of birth control. However we are happy to provide a referral to Dermatology who would. Thank you.

## 2023-05-10 PROBLEM — N91.5 OLIGOMENORRHEA: Status: ACTIVE | Noted: 2023-05-10

## 2023-07-12 ENCOUNTER — OFFICE VISIT (OUTPATIENT)
Dept: OBGYN CLINIC | Age: 23
End: 2023-07-12
Payer: COMMERCIAL

## 2023-07-12 VITALS
SYSTOLIC BLOOD PRESSURE: 128 MMHG | BODY MASS INDEX: 19.53 KG/M2 | HEIGHT: 63 IN | DIASTOLIC BLOOD PRESSURE: 70 MMHG | WEIGHT: 110.2 LBS | TEMPERATURE: 99.5 F | HEART RATE: 95 BPM

## 2023-07-12 DIAGNOSIS — Z20.2 POSSIBLE EXPOSURE TO STD: ICD-10-CM

## 2023-07-12 DIAGNOSIS — Z30.09 ENCOUNTER FOR COUNSELING REGARDING CONTRACEPTION: ICD-10-CM

## 2023-07-12 DIAGNOSIS — N89.8 VAGINAL DISCHARGE: ICD-10-CM

## 2023-07-12 DIAGNOSIS — N93.9 ABNORMAL UTERINE BLEEDING (AUB): Primary | ICD-10-CM

## 2023-07-12 PROCEDURE — G8420 CALC BMI NORM PARAMETERS: HCPCS | Performed by: OBSTETRICS & GYNECOLOGY

## 2023-07-12 PROCEDURE — 1036F TOBACCO NON-USER: CPT | Performed by: OBSTETRICS & GYNECOLOGY

## 2023-07-12 PROCEDURE — G8427 DOCREV CUR MEDS BY ELIG CLIN: HCPCS | Performed by: OBSTETRICS & GYNECOLOGY

## 2023-07-12 PROCEDURE — 99213 OFFICE O/P EST LOW 20 MIN: CPT | Performed by: OBSTETRICS & GYNECOLOGY

## 2023-07-13 LAB
CANDIDA DNA VAG QL NAA+PROBE: NORMAL
G VAGINALIS DNA SPEC QL NAA+PROBE: NORMAL
T VAGINALIS DNA VAG QL NAA+PROBE: NORMAL

## 2023-07-14 LAB
C TRACH DNA CVX QL NAA+PROBE: NEGATIVE
N GONORRHOEA DNA CERV MUCUS QL NAA+PROBE: NEGATIVE

## 2023-07-18 ENCOUNTER — TELEPHONE (OUTPATIENT)
Dept: OBGYN CLINIC | Age: 23
End: 2023-07-18

## 2023-07-18 RX ORDER — NORETHINDRONE ACETATE AND ETHINYL ESTRADIOL 1MG-20(21)
1 KIT ORAL DAILY
Qty: 1 PACKET | Refills: 3 | Status: CANCELLED | OUTPATIENT
Start: 2023-07-18

## 2023-07-18 RX ORDER — NORETHINDRONE ACETATE AND ETHINYL ESTRADIOL 1MG-20(24)
1 KIT ORAL DAILY
Qty: 1 PACKET | Refills: 11 | Status: SHIPPED | OUTPATIENT
Start: 2023-07-18

## 2023-07-18 NOTE — TELEPHONE ENCOUNTER
Pt called requesting Blisovi Rx be sent to her pharmacy, pt states this was discussed at her recent visit on 7/12/23. Please sign or advise.

## 2023-07-19 ENCOUNTER — TELEPHONE (OUTPATIENT)
Dept: OBGYN CLINIC | Age: 23
End: 2023-07-19

## 2023-07-19 DIAGNOSIS — R89.9 ABNORMAL LABORATORY TEST: Primary | ICD-10-CM

## 2023-07-19 NOTE — TELEPHONE ENCOUNTER
Patient no showed her appt with Dr. Della Flanagan, Endocrinology and is not able to establish with their practice. Please place external referral to [facility] for elevated 17 (OH)Progesterone. Thank you.

## 2023-08-29 ENCOUNTER — OFFICE VISIT (OUTPATIENT)
Dept: OBGYN CLINIC | Age: 23
End: 2023-08-29
Payer: COMMERCIAL

## 2023-08-29 VITALS
BODY MASS INDEX: 20.02 KG/M2 | HEART RATE: 65 BPM | TEMPERATURE: 98 F | HEIGHT: 63 IN | SYSTOLIC BLOOD PRESSURE: 112 MMHG | DIASTOLIC BLOOD PRESSURE: 80 MMHG | WEIGHT: 113 LBS

## 2023-08-29 DIAGNOSIS — N93.9 ABNORMAL UTERINE BLEEDING (AUB): Primary | ICD-10-CM

## 2023-08-29 PROCEDURE — G8427 DOCREV CUR MEDS BY ELIG CLIN: HCPCS | Performed by: OBSTETRICS & GYNECOLOGY

## 2023-08-29 PROCEDURE — 99213 OFFICE O/P EST LOW 20 MIN: CPT | Performed by: OBSTETRICS & GYNECOLOGY

## 2023-08-29 PROCEDURE — 1036F TOBACCO NON-USER: CPT | Performed by: OBSTETRICS & GYNECOLOGY

## 2023-08-29 PROCEDURE — G8420 CALC BMI NORM PARAMETERS: HCPCS | Performed by: OBSTETRICS & GYNECOLOGY

## 2023-09-04 PROBLEM — N93.9 ABNORMAL UTERINE BLEEDING (AUB): Status: ACTIVE | Noted: 2023-09-04

## 2023-09-28 ENCOUNTER — OFFICE VISIT (OUTPATIENT)
Dept: ORTHOPEDIC SURGERY | Age: 23
End: 2023-09-28
Payer: COMMERCIAL

## 2023-09-28 VITALS — WEIGHT: 113 LBS | HEIGHT: 63 IN | BODY MASS INDEX: 20.02 KG/M2

## 2023-09-28 DIAGNOSIS — M25.552 PAIN IN LEFT HIP: Primary | ICD-10-CM

## 2023-09-28 DIAGNOSIS — M24.852 SNAPPING HIP SYNDROME, LEFT: ICD-10-CM

## 2023-09-28 PROCEDURE — G8420 CALC BMI NORM PARAMETERS: HCPCS | Performed by: ORTHOPAEDIC SURGERY

## 2023-09-28 PROCEDURE — 99203 OFFICE O/P NEW LOW 30 MIN: CPT | Performed by: ORTHOPAEDIC SURGERY

## 2023-09-28 PROCEDURE — G8427 DOCREV CUR MEDS BY ELIG CLIN: HCPCS | Performed by: ORTHOPAEDIC SURGERY

## 2023-09-28 PROCEDURE — 1036F TOBACCO NON-USER: CPT | Performed by: ORTHOPAEDIC SURGERY

## 2023-09-28 NOTE — PROGRESS NOTES
Dr Carley Patiño      Date /Time 2023       9:33 AM EDT  Name Hai Blake             2000   Location  [facility]  MRN [id number]                Chief Complaint   Patient presents with    Hip Pain     Left         History of Present Illness    Hai Blake is a 24 y.o. female who presents with  left hip pain. Sent in consultation by Darlyn Yanes MD, . Injury Mechanism:  none. Worker's Comp. & legal issues:   none. Previous Treatments: Ice, Heat, and NSAIDs    Patient presents to the office today for a new problem. Patient here with a chief complaint of left hip pain. Patient states her left hip is painful laterally. No specific injury or trauma. She has increased pain with activities and improvement with rest.  She does complain of a snapping sound. Past History  Past Medical History:   Diagnosis Date    Depression      History reviewed. No pertinent surgical history. Family History   Adopted: Yes     Social History     Tobacco Use    Smoking status: Former     Packs/day: 0.25     Years: 1.00     Additional pack years: 0.00     Total pack years: 0.25     Types: Cigarettes     Quit date: 2019     Years since quittin.2    Smokeless tobacco: Never   Substance Use Topics    Alcohol use: Yes     Comment: occ      Current Outpatient Medications on File Prior to Visit   Medication Sig Dispense Refill    Norethin Ace-Eth Estrad-FE (BLISOVI 24 FE) 1-20 MG-MCG(24) TABS Take 1 tablet by mouth daily (Patient not taking: Reported on 2023) 1 packet 11    fluticasone (FLONASE) 50 MCG/ACT nasal spray 2 sprays by Each Nostril route daily 16 g 3    vilazodone HCl (VIIBRYD) 20 MG TABS Take 1 tablet by mouth daily       No current facility-administered medications on file prior to visit. ASCVD 10-YEAR RISK SCORE  The ASCVD Risk score (May DK, et al., 2019) failed to calculate for the following reasons:     The 2019 ASCVD risk score is only valid for ages 36 to 78

## 2023-10-13 ENCOUNTER — TELEPHONE (OUTPATIENT)
Dept: OBGYN CLINIC | Age: 23
End: 2023-10-13

## 2023-10-13 NOTE — TELEPHONE ENCOUNTER
Patient calling with some questions about not keeping current pregnancy. [facility] and would like to know if she will have issues conceiving in the future if she terminates. Would Dr. Rocio Prince have any information on what would be better to take the pill, or have the procedure? Routing to Dr. Rocio Prince.

## 2023-10-16 ENCOUNTER — OFFICE VISIT (OUTPATIENT)
Dept: OBGYN CLINIC | Age: 23
End: 2023-10-16
Payer: COMMERCIAL

## 2023-10-16 VITALS
DIASTOLIC BLOOD PRESSURE: 72 MMHG | HEART RATE: 98 BPM | WEIGHT: 115.6 LBS | SYSTOLIC BLOOD PRESSURE: 118 MMHG | HEIGHT: 63 IN | TEMPERATURE: 98.2 F | BODY MASS INDEX: 20.48 KG/M2

## 2023-10-16 DIAGNOSIS — N92.6 MISSED MENSES: Primary | ICD-10-CM

## 2023-10-16 PROCEDURE — 99213 OFFICE O/P EST LOW 20 MIN: CPT | Performed by: OBSTETRICS & GYNECOLOGY

## 2023-10-16 PROCEDURE — G8427 DOCREV CUR MEDS BY ELIG CLIN: HCPCS | Performed by: OBSTETRICS & GYNECOLOGY

## 2023-10-16 PROCEDURE — G8484 FLU IMMUNIZE NO ADMIN: HCPCS | Performed by: OBSTETRICS & GYNECOLOGY

## 2023-10-16 PROCEDURE — 1036F TOBACCO NON-USER: CPT | Performed by: OBSTETRICS & GYNECOLOGY

## 2023-10-16 PROCEDURE — G8420 CALC BMI NORM PARAMETERS: HCPCS | Performed by: OBSTETRICS & GYNECOLOGY

## 2023-10-20 ENCOUNTER — HOSPITAL ENCOUNTER (EMERGENCY)
Age: 23
Discharge: HOME | End: 2023-10-20
Attending: EMERGENCY MEDICINE
Payer: COMMERCIAL

## 2023-10-20 ENCOUNTER — APPOINTMENT (OUTPATIENT)
Dept: ULTRASOUND IMAGING | Age: 23
End: 2023-10-20
Payer: COMMERCIAL

## 2023-10-20 ENCOUNTER — TELEPHONE (OUTPATIENT)
Dept: OBGYN CLINIC | Age: 23
End: 2023-10-20

## 2023-10-20 VITALS
DIASTOLIC BLOOD PRESSURE: 74 MMHG | HEART RATE: 96 BPM | SYSTOLIC BLOOD PRESSURE: 109 MMHG | TEMPERATURE: 98.6 F | RESPIRATION RATE: 16 BRPM | OXYGEN SATURATION: 100 %

## 2023-10-20 DIAGNOSIS — N93.9 VAGINAL BLEEDING: ICD-10-CM

## 2023-10-20 DIAGNOSIS — O20.0 THREATENED MISCARRIAGE IN EARLY PREGNANCY: Primary | ICD-10-CM

## 2023-10-20 LAB
ABO + RH BLD: NORMAL
ALBUMIN SERPL-MCNC: 4.5 G/DL (ref 3.4–5)
ALBUMIN/GLOB SERPL: 1.7 {RATIO} (ref 1.1–2.2)
ALP SERPL-CCNC: 52 U/L (ref 40–129)
ALT SERPL-CCNC: 10 U/L (ref 10–40)
ANION GAP SERPL CALCULATED.3IONS-SCNC: 8 MMOL/L (ref 3–16)
AST SERPL-CCNC: 15 U/L (ref 15–37)
B-HCG SERPL EIA 3RD IS-ACNC: NORMAL MIU/ML
BASOPHILS # BLD: 0 K/UL (ref 0–0.2)
BASOPHILS NFR BLD: 0.2 %
BILIRUB SERPL-MCNC: 0.5 MG/DL (ref 0–1)
BUN SERPL-MCNC: 14 MG/DL (ref 7–20)
CALCIUM SERPL-MCNC: 9.3 MG/DL (ref 8.3–10.6)
CHLORIDE SERPL-SCNC: 100 MMOL/L (ref 99–110)
CO2 SERPL-SCNC: 24 MMOL/L (ref 21–32)
CREAT SERPL-MCNC: 0.7 MG/DL (ref 0.6–1.1)
DEPRECATED RDW RBC AUTO: 12.4 % (ref 12.4–15.4)
EOSINOPHIL # BLD: 0.1 K/UL (ref 0–0.6)
EOSINOPHIL NFR BLD: 1.5 %
GFR SERPLBLD CREATININE-BSD FMLA CKD-EPI: >60 ML/MIN/{1.73_M2}
GLUCOSE SERPL-MCNC: 89 MG/DL (ref 70–99)
HCT VFR BLD AUTO: 40.1 % (ref 36–48)
HGB BLD-MCNC: 14 G/DL (ref 12–16)
LYMPHOCYTES # BLD: 1.3 K/UL (ref 1–5.1)
LYMPHOCYTES NFR BLD: 21.9 %
MCH RBC QN AUTO: 30.9 PG (ref 26–34)
MCHC RBC AUTO-ENTMCNC: 34.9 G/DL (ref 31–36)
MCV RBC AUTO: 88.5 FL (ref 80–100)
MONOCYTES # BLD: 0.3 K/UL (ref 0–1.3)
MONOCYTES NFR BLD: 5.3 %
NEUTROPHILS # BLD: 4.1 K/UL (ref 1.7–7.7)
NEUTROPHILS NFR BLD: 71.1 %
PLATELET # BLD AUTO: 123 K/UL (ref 135–450)
PMV BLD AUTO: 8 FL (ref 5–10.5)
POTASSIUM SERPL-SCNC: 4 MMOL/L (ref 3.5–5.1)
PROT SERPL-MCNC: 7.2 G/DL (ref 6.4–8.2)
RBC # BLD AUTO: 4.53 M/UL (ref 4–5.2)
SODIUM SERPL-SCNC: 132 MMOL/L (ref 136–145)
WBC # BLD AUTO: 5.7 K/UL (ref 4–11)

## 2023-10-20 PROCEDURE — 85025 COMPLETE CBC W/AUTO DIFF WBC: CPT

## 2023-10-20 PROCEDURE — 80053 COMPREHEN METABOLIC PANEL: CPT

## 2023-10-20 PROCEDURE — 84702 CHORIONIC GONADOTROPIN TEST: CPT

## 2023-10-20 PROCEDURE — 86900 BLOOD TYPING SEROLOGIC ABO: CPT

## 2023-10-20 PROCEDURE — 99284 EMERGENCY DEPT VISIT MOD MDM: CPT

## 2023-10-20 PROCEDURE — 86901 BLOOD TYPING SEROLOGIC RH(D): CPT

## 2023-10-20 PROCEDURE — 76817 TRANSVAGINAL US OBSTETRIC: CPT

## 2023-10-20 ASSESSMENT — PAIN SCALES - GENERAL
PAINLEVEL_OUTOF10: 6
PAINLEVEL_OUTOF10: 3

## 2023-10-20 ASSESSMENT — ENCOUNTER SYMPTOMS
VOMITING: 0
RHINORRHEA: 0
SHORTNESS OF BREATH: 0
DIARRHEA: 0
BACK PAIN: 0
CHEST TIGHTNESS: 0
COUGH: 0
ABDOMINAL PAIN: 0

## 2023-10-20 ASSESSMENT — PAIN - FUNCTIONAL ASSESSMENT
PAIN_FUNCTIONAL_ASSESSMENT: 0-10
PAIN_FUNCTIONAL_ASSESSMENT: 0-10

## 2023-10-20 ASSESSMENT — PAIN DESCRIPTION - LOCATION
LOCATION: HEAD
LOCATION: BACK

## 2023-10-20 NOTE — TELEPHONE ENCOUNTER
Patient called this morning saying that she spoke to Dr. Luz Bowers and was advised to come into office today. Patient was not sure if she just needed a lab visit or to see the physician. Patient said that she was having some bleeding and spoke to the on call who advised calling office in the morning. Patient was in office on 10/16/23 to see Katina Arnold for pregnancy. Would we want patient to come in for Lab visit today for hcg or does she need to see the physician? Routing to Dr. Luz Bowers and Dr. Hershel Frankel.

## 2023-10-20 NOTE — ED NOTES
Pt instructed to follow up with OB/GYN.  Assessed per Dr Lilia Alonso, [address] [facility] [place], [facility]  06/71/12 2204

## 2023-10-20 NOTE — TELEPHONE ENCOUNTER
Pt called back checking the status of previous message sent. Pt reports bleeding in pregnancy. Informed pt we are waiting on doctors response wether this is a lab visit for hcg or an OV for Evaluation? Pt was given ED pain and bleeding precautions at this time. Pt reports [facility] and was leaning towards going through with pregnancy.   Please Advise

## 2023-10-20 NOTE — ED PROVIDER NOTES
Constitutional:       General: She is not in acute distress. Appearance: Normal appearance. She is not ill-appearing. HENT:      Head: Normocephalic and atraumatic. Right Ear: External ear normal.      Left Ear: External ear normal.      Nose: Nose normal. No congestion. Mouth/Throat:      Mouth: Mucous membranes are moist.      Pharynx: Oropharynx is clear. Eyes:      General:         Right eye: No discharge. Left eye: No discharge. Conjunctiva/sclera: Conjunctivae normal.   Pulmonary:      Effort: Pulmonary effort is normal. No respiratory distress. Breath sounds: No wheezing. Genitourinary:     Comments: Defers  Musculoskeletal:      Cervical back: Normal range of motion and neck supple. Skin:     General: Skin is warm and dry. Neurological:      General: No focal deficit present. Mental Status: She is alert and oriented to person, place, and time. Psychiatric:         Mood and Affect: Mood normal.         Behavior: Behavior normal.           DIAGNOSTIC RESULTS:  LABS:    Labs Reviewed   CBC WITH AUTO DIFFERENTIAL - Abnormal; Notable for the following components:       Result Value    Platelets 335 (*)     All other components within normal limits   COMPREHENSIVE METABOLIC PANEL W/ REFLEX TO MG FOR LOW K - Abnormal; Notable for the following components:    Sodium 132 (*)     All other components within normal limits   HCG, QUANTITATIVE, PREGNANCY   URINALYSIS WITH REFLEX TO CULTURE   ABO/RH       When ordered, only abnormal lab results are displayed. All other labs were within normal range or not returned as of this dictation. \      RADIOLOGY:    Non-plain film images such as CT, Ultrasound and MRI are read by the radiologist. Interpretation per the Radiologist below, if available at the time of this note:  US OB TRANSVAGINAL   Final Result   1. Heterogeneous area of echogenic signal adjacent to the gestational sac   representing perigestational hemorrhage.

## 2023-10-20 NOTE — TELEPHONE ENCOUNTER
In reviewing her previous note it appears that we were unable to see fetal heart tones at last office visit. If availability would have patient added on for US with office visit if availability with a provider. If not she can go to the ER.

## 2023-10-23 ENCOUNTER — INITIAL PRENATAL (OUTPATIENT)
Dept: OBGYN CLINIC | Age: 23
End: 2023-10-23
Payer: COMMERCIAL

## 2023-10-23 VITALS
DIASTOLIC BLOOD PRESSURE: 68 MMHG | SYSTOLIC BLOOD PRESSURE: 100 MMHG | HEART RATE: 88 BPM | WEIGHT: 114.8 LBS | BODY MASS INDEX: 20.34 KG/M2

## 2023-10-23 DIAGNOSIS — F39 MOOD DISORDER (HCC): ICD-10-CM

## 2023-10-23 DIAGNOSIS — Z34.01 ENCOUNTER FOR PRENATAL CARE IN FIRST TRIMESTER OF FIRST PREGNANCY: Primary | ICD-10-CM

## 2023-10-23 DIAGNOSIS — D69.6 THROMBOCYTOPENIA (HCC): ICD-10-CM

## 2023-10-23 DIAGNOSIS — O46.8X1 SUBCHORIONIC HEMORRHAGE OF PLACENTA IN FIRST TRIMESTER, SINGLE OR UNSPECIFIED FETUS: ICD-10-CM

## 2023-10-23 DIAGNOSIS — O46.90 VAGINAL BLEEDING IN PREGNANCY: ICD-10-CM

## 2023-10-23 DIAGNOSIS — O41.8X10 SUBCHORIONIC HEMORRHAGE OF PLACENTA IN FIRST TRIMESTER, SINGLE OR UNSPECIFIED FETUS: ICD-10-CM

## 2023-10-23 PROCEDURE — G8420 CALC BMI NORM PARAMETERS: HCPCS | Performed by: OBSTETRICS & GYNECOLOGY

## 2023-10-23 PROCEDURE — G8427 DOCREV CUR MEDS BY ELIG CLIN: HCPCS | Performed by: OBSTETRICS & GYNECOLOGY

## 2023-10-23 PROCEDURE — 99213 OFFICE O/P EST LOW 20 MIN: CPT | Performed by: OBSTETRICS & GYNECOLOGY

## 2023-10-23 PROCEDURE — G8484 FLU IMMUNIZE NO ADMIN: HCPCS | Performed by: OBSTETRICS & GYNECOLOGY

## 2023-10-23 PROCEDURE — 1036F TOBACCO NON-USER: CPT | Performed by: OBSTETRICS & GYNECOLOGY

## 2023-10-23 PROCEDURE — 36415 COLL VENOUS BLD VENIPUNCTURE: CPT | Performed by: OBSTETRICS & GYNECOLOGY

## 2023-10-24 LAB
ABO + RH BLD: NORMAL
BASOPHILS # BLD: 0 K/UL (ref 0–0.2)
BASOPHILS NFR BLD: 0.4 %
BLD GP AB SCN SERPL QL: NORMAL
DEPRECATED RDW RBC AUTO: 13.2 % (ref 12.4–15.4)
EOSINOPHIL # BLD: 0 K/UL (ref 0–0.6)
EOSINOPHIL NFR BLD: 0.8 %
HBV SURFACE AG SERPL QL IA: NORMAL
HCT VFR BLD AUTO: 38.7 % (ref 36–48)
HCV AB S/CO SERPL IA: 0.04 IV
HCV AB SERPL QL IA: NEGATIVE
HGB BLD-MCNC: 13.6 G/DL (ref 12–16)
HIV 1+2 AB+HIV1 P24 AG SERPL QL IA: NORMAL
HIV 2 AB SERPL QL IA: NORMAL
HIV1 AB SERPL QL IA: NORMAL
HIV1 P24 AG SERPL QL IA: NORMAL
LYMPHOCYTES # BLD: 0.9 K/UL (ref 1–5.1)
LYMPHOCYTES NFR BLD: 17.5 %
MCH RBC QN AUTO: 31.4 PG (ref 26–34)
MCHC RBC AUTO-ENTMCNC: 35.2 G/DL (ref 31–36)
MCV RBC AUTO: 89.1 FL (ref 80–100)
MONOCYTES # BLD: 0.3 K/UL (ref 0–1.3)
MONOCYTES NFR BLD: 6.2 %
NEUTROPHILS # BLD: 3.7 K/UL (ref 1.7–7.7)
NEUTROPHILS NFR BLD: 75.1 %
PLATELET # BLD AUTO: 134 K/UL (ref 135–450)
PMV BLD AUTO: 8.9 FL (ref 5–10.5)
RBC # BLD AUTO: 4.34 M/UL (ref 4–5.2)
REAGIN+T PALLIDUM IGG+IGM SERPL-IMP: NORMAL
RUBV IGG SERPL IA-ACNC: 98.9 IU/ML
TSH SERPL DL<=0.005 MIU/L-ACNC: 3.8 UIU/ML (ref 0.27–4.2)
VZV IGG SER QL IA: NORMAL
WBC # BLD AUTO: 4.9 K/UL (ref 4–11)

## 2023-10-27 ENCOUNTER — TELEPHONE (OUTPATIENT)
Dept: OBGYN CLINIC | Age: 23
End: 2023-10-27

## 2023-10-27 NOTE — TELEPHONE ENCOUNTER
Pt called stating she is no longer experiencing bleeding or pain. Pt asking if intercourse is okay?   Please Advise

## 2023-10-31 ENCOUNTER — TELEPHONE (OUTPATIENT)
Dept: OBGYN CLINIC | Age: 23
End: 2023-10-31

## 2023-10-31 NOTE — TELEPHONE ENCOUNTER
Staff Message from [address] [place] [address]:  Patient did not schedule her 4 week JANNET visit. Can we make sure she does so. Thank you. Called pt to schedule JANNET visit, [facility] and would like to wait to schedule her JANNET until after her appt with them. Pt is still unsure of her decision regarding going forward with her pregnancy or not. Pt stated she will return call and let us know her decision [facility].

## 2023-11-04 ENCOUNTER — TELEPHONE (OUTPATIENT)
Dept: OBGYN | Age: 23
End: 2023-11-04

## 2023-11-09 ENCOUNTER — ROUTINE PRENATAL (OUTPATIENT)
Dept: OBGYN CLINIC | Age: 23
End: 2023-11-09
Payer: COMMERCIAL

## 2023-11-09 VITALS
WEIGHT: 116 LBS | SYSTOLIC BLOOD PRESSURE: 108 MMHG | HEART RATE: 78 BPM | BODY MASS INDEX: 20.55 KG/M2 | DIASTOLIC BLOOD PRESSURE: 66 MMHG | TEMPERATURE: 99.1 F

## 2023-11-09 DIAGNOSIS — O09.899 MATERNAL VARICELLA, NON-IMMUNE: ICD-10-CM

## 2023-11-09 DIAGNOSIS — R12 HEARTBURN DURING PREGNANCY IN FIRST TRIMESTER: ICD-10-CM

## 2023-11-09 DIAGNOSIS — R79.89 ELEVATED TSH: ICD-10-CM

## 2023-11-09 DIAGNOSIS — Z34.01 ENCOUNTER FOR PRENATAL CARE IN FIRST TRIMESTER OF FIRST PREGNANCY: Primary | ICD-10-CM

## 2023-11-09 DIAGNOSIS — Z28.39 MATERNAL VARICELLA, NON-IMMUNE: ICD-10-CM

## 2023-11-09 DIAGNOSIS — O21.9 NAUSEA AND VOMITING IN PREGNANCY: ICD-10-CM

## 2023-11-09 DIAGNOSIS — O46.90 VAGINAL BLEEDING IN PREGNANCY: ICD-10-CM

## 2023-11-09 DIAGNOSIS — D69.6 THROMBOCYTOPENIA (HCC): ICD-10-CM

## 2023-11-09 DIAGNOSIS — O41.8X10 SUBCHORIONIC HEMORRHAGE OF PLACENTA IN FIRST TRIMESTER, SINGLE OR UNSPECIFIED FETUS: ICD-10-CM

## 2023-11-09 DIAGNOSIS — O46.8X1 SUBCHORIONIC HEMORRHAGE OF PLACENTA IN FIRST TRIMESTER, SINGLE OR UNSPECIFIED FETUS: ICD-10-CM

## 2023-11-09 DIAGNOSIS — F39 MOOD DISORDER (HCC): ICD-10-CM

## 2023-11-09 DIAGNOSIS — O26.891 HEARTBURN DURING PREGNANCY IN FIRST TRIMESTER: ICD-10-CM

## 2023-11-09 PROCEDURE — G8427 DOCREV CUR MEDS BY ELIG CLIN: HCPCS | Performed by: OBSTETRICS & GYNECOLOGY

## 2023-11-09 PROCEDURE — 99213 OFFICE O/P EST LOW 20 MIN: CPT | Performed by: OBSTETRICS & GYNECOLOGY

## 2023-11-09 PROCEDURE — 1036F TOBACCO NON-USER: CPT | Performed by: OBSTETRICS & GYNECOLOGY

## 2023-11-09 PROCEDURE — G8420 CALC BMI NORM PARAMETERS: HCPCS | Performed by: OBSTETRICS & GYNECOLOGY

## 2023-11-09 PROCEDURE — G8484 FLU IMMUNIZE NO ADMIN: HCPCS | Performed by: OBSTETRICS & GYNECOLOGY

## 2023-11-09 RX ORDER — FAMOTIDINE 20 MG/1
20 TABLET, FILM COATED ORAL 2 TIMES DAILY
Qty: 60 TABLET | Refills: 3 | Status: SHIPPED | OUTPATIENT
Start: 2023-11-09

## 2023-11-09 RX ORDER — ONDANSETRON 4 MG/1
4 TABLET, ORALLY DISINTEGRATING ORAL EVERY 8 HOURS PRN
Qty: 20 TABLET | Refills: 1 | Status: SHIPPED | OUTPATIENT
Start: 2023-11-09

## 2023-11-09 NOTE — PROGRESS NOTES
Hgb 13.6, Plt 134, UDS needs collected, UCx needs collected, GCCT neg/neg, Pap NILM 12/6/2022     - Anatomy: Will plan at 20 weeks      - Aneuploidy screen: desires [id number] - will have lab visit after 10 weeks     - Carrier screen: Desires - will have with [facility] lab visit      - AFP: Will discuss at 16 weeks      - Pregnancy physiology and precautions reviewed     - Return precautions reviewed      - Will follow-up in 4 weeks for JANNET visit      2. Subchorionic hemorrhage of placenta in first trimester, single or unspecified fetus     - Has had vaginal bleeding secondary to sub-chorionic hemorrhage     - Subchorionic bleed seen measuring 1.51 x 1.99 x 1.87 cm at last visit - resolving today     - Bleeding had resolved - however exacerbated with intercourse     - Findings and sequelae reviewed     - Return precautions reviewed      - Emphasized pelvic rest precautions      - Will continue to follow      3. Vaginal bleeding in pregnancy     - See above      4. Mood disorder ([address])     - Doing well with SSRI (Vibryd) - discontinued use     - Will continue to follow     5. Heartburn in pregnancy     - Rx provided for Pepcid     - Return precautions reviewed    6. Nausea and vomiting in pregnancy     - Increased nausea and vomiting     - Reviewed conservative measures     - Reviewed importance of increased hydration and small frequent meals     - Rx provided for Zofran     - Return precautions reviewed     7. Thrombocytopenia (HCC)     - Plts 134     - Stable from repeats in the past     - Has been referred to hematology     - Aside from bleeding associated with sub-chorionic hemorrhage, denies bleeding episodes    8. Elevated TSH     - TSH elevated at 3.8     - Has appt with Endocrinology next week      - Will repeat as needed and likely start Synthroid, however as is already scheduled in short interval will await consult recommendations    9.  Maternal varicella, non-immune     - Recommend to avoid sick contacts

## 2023-11-10 PROBLEM — O46.8X1 SUBCHORIONIC HEMORRHAGE IN FIRST TRIMESTER: Status: ACTIVE | Noted: 2023-11-10

## 2023-11-10 PROBLEM — R79.89 ELEVATED TSH: Status: ACTIVE | Noted: 2023-11-10

## 2023-11-10 PROBLEM — O21.9 NAUSEA AND VOMITING IN PREGNANCY: Status: ACTIVE | Noted: 2023-11-10

## 2023-11-10 PROBLEM — O41.8X10 SUBCHORIONIC HEMORRHAGE IN FIRST TRIMESTER: Status: ACTIVE | Noted: 2023-11-10

## 2023-11-10 PROBLEM — Z28.39 MATERNAL VARICELLA, NON-IMMUNE: Status: ACTIVE | Noted: 2023-11-10

## 2023-11-10 PROBLEM — O46.90 VAGINAL BLEEDING IN PREGNANCY: Status: ACTIVE | Noted: 2023-11-10

## 2023-11-10 PROBLEM — R12 HEARTBURN DURING PREGNANCY IN FIRST TRIMESTER: Status: ACTIVE | Noted: 2023-11-10

## 2023-11-10 PROBLEM — D69.6 THROMBOCYTOPENIA (HCC): Status: ACTIVE | Noted: 2023-11-10

## 2023-11-10 PROBLEM — Z34.01 ENCOUNTER FOR PRENATAL CARE IN FIRST TRIMESTER OF FIRST PREGNANCY: Status: ACTIVE | Noted: 2023-11-10

## 2023-11-10 PROBLEM — O20.8 SUBCHORIONIC HEMORRHAGE IN FIRST TRIMESTER: Status: ACTIVE | Noted: 2023-11-10

## 2023-11-10 PROBLEM — O26.891 HEARTBURN DURING PREGNANCY IN FIRST TRIMESTER: Status: ACTIVE | Noted: 2023-11-10

## 2023-11-10 PROBLEM — O09.899 MATERNAL VARICELLA, NON-IMMUNE: Status: ACTIVE | Noted: 2023-11-10

## 2023-11-22 ENCOUNTER — NURSE ONLY (OUTPATIENT)
Dept: OBGYN CLINIC | Age: 23
End: 2023-11-22

## 2023-11-22 NOTE — PROGRESS NOTES
Blood draw from R Cephalic x 1 attempt without difficulty. MATERNITI 21 drawn. Patient tolerated well.  Felix Beck MA
intact

## 2023-12-12 ENCOUNTER — ROUTINE PRENATAL (OUTPATIENT)
Dept: OBGYN CLINIC | Age: 23
End: 2023-12-12
Payer: COMMERCIAL

## 2023-12-12 VITALS
BODY MASS INDEX: 20.62 KG/M2 | SYSTOLIC BLOOD PRESSURE: 110 MMHG | WEIGHT: 116.4 LBS | HEART RATE: 97 BPM | DIASTOLIC BLOOD PRESSURE: 62 MMHG

## 2023-12-12 DIAGNOSIS — Z28.39 MATERNAL VARICELLA, NON-IMMUNE: ICD-10-CM

## 2023-12-12 DIAGNOSIS — Z34.02 ENCOUNTER FOR PRENATAL CARE IN SECOND TRIMESTER OF FIRST PREGNANCY: ICD-10-CM

## 2023-12-12 DIAGNOSIS — F39 MOOD DISORDER (HCC): ICD-10-CM

## 2023-12-12 DIAGNOSIS — O09.899 MATERNAL VARICELLA, NON-IMMUNE: ICD-10-CM

## 2023-12-12 DIAGNOSIS — O21.9 NAUSEA AND VOMITING IN PREGNANCY: ICD-10-CM

## 2023-12-12 DIAGNOSIS — D69.6 THROMBOCYTOPENIA (HCC): ICD-10-CM

## 2023-12-12 DIAGNOSIS — O46.90 VAGINAL BLEEDING IN PREGNANCY: ICD-10-CM

## 2023-12-12 DIAGNOSIS — O26.892 HEARTBURN DURING PREGNANCY IN SECOND TRIMESTER: Primary | ICD-10-CM

## 2023-12-12 DIAGNOSIS — R79.89 ELEVATED TSH: ICD-10-CM

## 2023-12-12 DIAGNOSIS — O41.8X10 SUBCHORIONIC HEMORRHAGE OF PLACENTA IN FIRST TRIMESTER, SINGLE OR UNSPECIFIED FETUS: ICD-10-CM

## 2023-12-12 DIAGNOSIS — O46.8X1 SUBCHORIONIC HEMORRHAGE OF PLACENTA IN FIRST TRIMESTER, SINGLE OR UNSPECIFIED FETUS: ICD-10-CM

## 2023-12-12 DIAGNOSIS — R12 HEARTBURN DURING PREGNANCY IN SECOND TRIMESTER: Primary | ICD-10-CM

## 2023-12-12 PROCEDURE — 1036F TOBACCO NON-USER: CPT | Performed by: OBSTETRICS & GYNECOLOGY

## 2023-12-12 PROCEDURE — G8420 CALC BMI NORM PARAMETERS: HCPCS | Performed by: OBSTETRICS & GYNECOLOGY

## 2023-12-12 PROCEDURE — 99213 OFFICE O/P EST LOW 20 MIN: CPT | Performed by: OBSTETRICS & GYNECOLOGY

## 2023-12-12 PROCEDURE — G8427 DOCREV CUR MEDS BY ELIG CLIN: HCPCS | Performed by: OBSTETRICS & GYNECOLOGY

## 2023-12-12 PROCEDURE — G8484 FLU IMMUNIZE NO ADMIN: HCPCS | Performed by: OBSTETRICS & GYNECOLOGY

## 2023-12-13 PROBLEM — O26.892 HEARTBURN DURING PREGNANCY IN SECOND TRIMESTER: Status: ACTIVE | Noted: 2023-11-10

## 2023-12-13 PROBLEM — Z34.02 ENCOUNTER FOR PRENATAL CARE IN SECOND TRIMESTER OF FIRST PREGNANCY: Status: ACTIVE | Noted: 2023-11-10

## 2023-12-13 RX ORDER — FAMOTIDINE 20 MG/1
20 TABLET, FILM COATED ORAL 2 TIMES DAILY
Qty: 60 TABLET | Refills: 3 | Status: SHIPPED | OUTPATIENT
Start: 2023-12-13

## 2023-12-28 ENCOUNTER — TELEPHONE (OUTPATIENT)
Dept: OBGYN CLINIC | Age: 23
End: 2023-12-28

## 2023-12-28 NOTE — TELEPHONE ENCOUNTER
[facility] representative called to let us know that her test results returned with a postive [place] Carrier Screen. They will be faxing further information.

## 2024-01-03 NOTE — TELEPHONE ENCOUNTER
Please reach out to patient and advise that carrier screening is positive for being a carrier of cystic fibrosis.  Next step would be to test the father of the baby to see if there is a risk of the baby having cystic fibrosis.  Both the patient and the father would have to be positive for the baby to be affected.  We would also like her to be seen as a consult with the maternal fetal medicine specialist regarding this as well, just to provide additional information, counseling and testing options.  Thank you.

## 2024-01-04 NOTE — TELEPHONE ENCOUNTER
Pt returned call to office and is aware of result. She would like the referral placed. She is asking if FOB can also be seen and tested at [facility] ? Please advise    Head atraumatic, normal cephalic shape.

## 2024-01-05 NOTE — TELEPHONE ENCOUNTER
They do not see him as a patient.  But they can order the testing with their office, or we can order it through ours.  Whichever they prefer.  Thank you.

## 2024-01-10 ENCOUNTER — ROUTINE PRENATAL (OUTPATIENT)
Dept: OBGYN CLINIC | Age: 24
End: 2024-01-10
Payer: COMMERCIAL

## 2024-01-10 VITALS
SYSTOLIC BLOOD PRESSURE: 112 MMHG | HEIGHT: 63 IN | BODY MASS INDEX: 20.73 KG/M2 | WEIGHT: 117 LBS | TEMPERATURE: 97.5 F | HEART RATE: 100 BPM | DIASTOLIC BLOOD PRESSURE: 68 MMHG

## 2024-01-10 DIAGNOSIS — R35.0 URINARY FREQUENCY: ICD-10-CM

## 2024-01-10 DIAGNOSIS — O46.90 VAGINAL BLEEDING IN PREGNANCY: ICD-10-CM

## 2024-01-10 DIAGNOSIS — D69.6 THROMBOCYTOPENIA (HCC): ICD-10-CM

## 2024-01-10 DIAGNOSIS — O21.9 NAUSEA AND VOMITING IN PREGNANCY: ICD-10-CM

## 2024-01-10 DIAGNOSIS — O41.8X10 SUBCHORIONIC HEMORRHAGE OF PLACENTA IN FIRST TRIMESTER, SINGLE OR UNSPECIFIED FETUS: ICD-10-CM

## 2024-01-10 DIAGNOSIS — Z34.02 ENCOUNTER FOR PRENATAL CARE IN SECOND TRIMESTER OF FIRST PREGNANCY: Primary | ICD-10-CM

## 2024-01-10 DIAGNOSIS — O09.892 CYSTIC FIBROSIS CARRIER IN SECOND TRIMESTER, ANTEPARTUM: ICD-10-CM

## 2024-01-10 DIAGNOSIS — R79.89 ELEVATED TSH: ICD-10-CM

## 2024-01-10 DIAGNOSIS — O46.8X1 SUBCHORIONIC HEMORRHAGE OF PLACENTA IN FIRST TRIMESTER, SINGLE OR UNSPECIFIED FETUS: ICD-10-CM

## 2024-01-10 DIAGNOSIS — O26.892 HEARTBURN DURING PREGNANCY IN SECOND TRIMESTER: ICD-10-CM

## 2024-01-10 DIAGNOSIS — F39 MOOD DISORDER (HCC): ICD-10-CM

## 2024-01-10 DIAGNOSIS — Z14.1 CYSTIC FIBROSIS CARRIER IN SECOND TRIMESTER, ANTEPARTUM: ICD-10-CM

## 2024-01-10 DIAGNOSIS — O09.899 MATERNAL VARICELLA, NON-IMMUNE: ICD-10-CM

## 2024-01-10 DIAGNOSIS — R12 HEARTBURN DURING PREGNANCY IN SECOND TRIMESTER: ICD-10-CM

## 2024-01-10 DIAGNOSIS — Z28.39 MATERNAL VARICELLA, NON-IMMUNE: ICD-10-CM

## 2024-01-10 PROCEDURE — G8420 CALC BMI NORM PARAMETERS: HCPCS | Performed by: OBSTETRICS & GYNECOLOGY

## 2024-01-10 PROCEDURE — G8427 DOCREV CUR MEDS BY ELIG CLIN: HCPCS | Performed by: OBSTETRICS & GYNECOLOGY

## 2024-01-10 PROCEDURE — G8484 FLU IMMUNIZE NO ADMIN: HCPCS | Performed by: OBSTETRICS & GYNECOLOGY

## 2024-01-10 PROCEDURE — 99213 OFFICE O/P EST LOW 20 MIN: CPT | Performed by: OBSTETRICS & GYNECOLOGY

## 2024-01-10 PROCEDURE — 1036F TOBACCO NON-USER: CPT | Performed by: OBSTETRICS & GYNECOLOGY

## 2024-01-10 PROCEDURE — 36415 COLL VENOUS BLD VENIPUNCTURE: CPT | Performed by: OBSTETRICS & GYNECOLOGY

## 2024-01-10 NOTE — PROGRESS NOTES
Behavior: Behavior normal.         Thought Content: Thought content normal.         FHR: 153 bpm by doppler    Assessment/Plan:   Denise Moore is a 23 y.o.  at 17w5d who presents for routine OB visit      1. Encounter for prenatal care in second trimester of first pregnancy     - Doing well today without complaints     - Fetal wellbeing reassuring by FHR today     - Continue PNV     - PNL: O positive/ab negative, R Immune, Varicella non-immune, HepB non-reactive, HepC negative, HIV non-reactive, Syphilis non-reactive, TSH 3.8 (has upcoming appt with Endocrinology), Hgb 13.6, Plt 134, UDS needs collected, UCx collected today, GCCT neg/neg, Pap NILM 2022     - Anatomy: Will plan at 20 weeks      - Aneuploidy screen: [id number] negative, female      - Carrier screen: CF carrier     - AFP: Declines     - Pregnancy physiology and precautions reviewed     - Return precautions reviewed      - Will follow-up in 4 weeks for JANNET visit and anatomy scan     2. Subchorionic hemorrhage of placenta in first trimester, single or unspecified fetus     - Has had vaginal bleeding secondary to sub-chorionic hemorrhage     - Subchorionic bleed seen measuring 1.51 x 1.99 x 1.87 cm at last visit - resolving on repeat imaging     - Bleeding had resolved     - Findings and sequelae reviewed     - Return precautions reviewed      - Will continue to follow      3. Vaginal bleeding in pregnancy     - See above      4. Mood disorder (HCC)     - Maternal wellness questionnaire reviewed - no concerns today     - Doing well off SSRI (Vibryd) - discontinued use     - Does not desire to restart medications at this time     - Will schedule with Yris     - Will continue to follow      5. Heartburn in pregnancy     - Doing well with Pepcid     - Return precautions reviewed     6. Nausea and vomiting in pregnancy     - Improving nausea and vomiting     - Reviewed conservative measures     - Reviewed importance of increased hydration and

## 2024-01-11 LAB
BACTERIA UR CULT: NORMAL
BACTERIA URNS QL MICRO: ABNORMAL /HPF
BASOPHILS # BLD: 0 K/UL (ref 0–0.2)
BASOPHILS NFR BLD: 0.4 %
BILIRUB UR QL STRIP.AUTO: NEGATIVE
CLARITY UR: ABNORMAL
COLOR UR: YELLOW
DEPRECATED RDW RBC AUTO: 14.1 % (ref 12.4–15.4)
EOSINOPHIL # BLD: 0.1 K/UL (ref 0–0.6)
EOSINOPHIL NFR BLD: 1.5 %
EPI CELLS #/AREA URNS AUTO: 31 /HPF (ref 0–5)
GLUCOSE UR STRIP.AUTO-MCNC: NEGATIVE MG/DL
HCT VFR BLD AUTO: 36.8 % (ref 36–48)
HGB BLD-MCNC: 12.8 G/DL (ref 12–16)
HGB UR QL STRIP.AUTO: NEGATIVE
HYALINE CASTS #/AREA URNS AUTO: 0 /LPF (ref 0–8)
KETONES UR STRIP.AUTO-MCNC: NEGATIVE MG/DL
LEUKOCYTE ESTERASE UR QL STRIP.AUTO: ABNORMAL
LYMPHOCYTES # BLD: 1 K/UL (ref 1–5.1)
LYMPHOCYTES NFR BLD: 14 %
MCH RBC QN AUTO: 31.8 PG (ref 26–34)
MCHC RBC AUTO-ENTMCNC: 34.6 G/DL (ref 31–36)
MCV RBC AUTO: 91.8 FL (ref 80–100)
MONOCYTES # BLD: 0.3 K/UL (ref 0–1.3)
MONOCYTES NFR BLD: 4.8 %
NEUTROPHILS # BLD: 5.7 K/UL (ref 1.7–7.7)
NEUTROPHILS NFR BLD: 79.3 %
NITRITE UR QL STRIP.AUTO: NEGATIVE
PH UR STRIP.AUTO: 7.5 [PH] (ref 5–8)
PLATELET # BLD AUTO: 123 K/UL (ref 135–450)
PMV BLD AUTO: 9.1 FL (ref 5–10.5)
PROT UR STRIP.AUTO-MCNC: NEGATIVE MG/DL
RBC # BLD AUTO: 4.01 M/UL (ref 4–5.2)
RBC #/AREA URNS HPF: ABNORMAL /HPF (ref 0–4)
SP GR UR STRIP.AUTO: 1.02 (ref 1–1.03)
UA DIPSTICK W REFLEX MICRO PNL UR: YES
URN SPEC COLLECT METH UR: ABNORMAL
UROBILINOGEN UR STRIP-ACNC: 0.2 E.U./DL
WBC # BLD AUTO: 7.2 K/UL (ref 4–11)
WBC #/AREA URNS AUTO: 1 /HPF (ref 0–5)

## 2024-01-12 ENCOUNTER — TELEPHONE (OUTPATIENT)
Dept: OBGYN CLINIC | Age: 24
End: 2024-01-12

## 2024-01-12 NOTE — TELEPHONE ENCOUNTER
Patient saw her results on mychart for the urinalysis. If she is needing to be treated, can she do a 5 day instead of a 7 day treatment?    Routing to Dr. Boyle.

## 2024-01-17 PROBLEM — O09.892 CYSTIC FIBROSIS CARRIER IN SECOND TRIMESTER, ANTEPARTUM: Status: ACTIVE | Noted: 2024-01-17

## 2024-01-17 PROBLEM — Z14.1 CYSTIC FIBROSIS CARRIER IN SECOND TRIMESTER, ANTEPARTUM: Status: ACTIVE | Noted: 2024-01-17

## 2024-02-03 ENCOUNTER — HOSPITAL ENCOUNTER (OUTPATIENT)
Age: 24
Discharge: HOME | End: 2024-02-03
Attending: OBSTETRICS & GYNECOLOGY | Admitting: OBSTETRICS & GYNECOLOGY
Payer: COMMERCIAL

## 2024-02-03 VITALS
WEIGHT: 117 LBS | HEART RATE: 113 BPM | OXYGEN SATURATION: 99 % | RESPIRATION RATE: 16 BRPM | SYSTOLIC BLOOD PRESSURE: 120 MMHG | BODY MASS INDEX: 20.73 KG/M2 | DIASTOLIC BLOOD PRESSURE: 75 MMHG | HEIGHT: 63 IN | TEMPERATURE: 97.4 F

## 2024-02-03 PROBLEM — O46.92 VAGINAL BLEEDING IN PREGNANCY, SECOND TRIMESTER: Status: ACTIVE | Noted: 2024-02-03

## 2024-02-03 LAB
BASOPHILS # BLD: 0 K/UL (ref 0–0.2)
BASOPHILS NFR BLD: 0.2 %
DEPRECATED RDW RBC AUTO: 13.6 % (ref 12.4–15.4)
EOSINOPHIL # BLD: 0.1 K/UL (ref 0–0.6)
EOSINOPHIL NFR BLD: 0.8 %
HCT VFR BLD AUTO: 34 % (ref 36–48)
HGB BLD-MCNC: 12 G/DL (ref 12–16)
LYMPHOCYTES # BLD: 1.3 K/UL (ref 1–5.1)
LYMPHOCYTES NFR BLD: 14.4 %
MCH RBC QN AUTO: 32.1 PG (ref 26–34)
MCHC RBC AUTO-ENTMCNC: 35.3 G/DL (ref 31–36)
MCV RBC AUTO: 91 FL (ref 80–100)
MONOCYTES # BLD: 0.5 K/UL (ref 0–1.3)
MONOCYTES NFR BLD: 5.7 %
NEUTROPHILS # BLD: 7.4 K/UL (ref 1.7–7.7)
NEUTROPHILS NFR BLD: 78.9 %
PLATELET # BLD AUTO: 126 K/UL (ref 135–450)
PMV BLD AUTO: 8.2 FL (ref 5–10.5)
RBC # BLD AUTO: 3.74 M/UL (ref 4–5.2)
WBC # BLD AUTO: 9.3 K/UL (ref 4–11)

## 2024-02-03 PROCEDURE — 99235 HOSP IP/OBS SAME DATE MOD 70: CPT | Performed by: OBSTETRICS & GYNECOLOGY

## 2024-02-03 PROCEDURE — 85025 COMPLETE CBC W/AUTO DIFF WBC: CPT

## 2024-02-03 PROCEDURE — 99202 OFFICE O/P NEW SF 15 MIN: CPT

## 2024-02-04 NOTE — PROGRESS NOTES
Pt verbalized understanding of verbal and written discharge instructions and denies having questions at this time. Pt instructed to follow up with scheduled prenatal appointment with Dr. Boyle. Pt left OB unit at 2130 ambulatory, undelivered, and in stable condition, accompanied by SO. Patient is not in active labor.

## 2024-02-04 NOTE — H&P
Eosinophils Absolute 2024 0.1     Basophils Absolute 2024 0.0      No results found.    Assessment/Plan:   23 y.o.  at 21w1d EGA with VB after SI:   Small cervical abrasion -- cauterized with single silver nitrate stick, hemostasis obtained.   Friable cervix -- recommend pelvic rest until they FU with primary OB, likely will continue to occur throughout remainder of pregnancy. Return pain / bleeding / Ob precautions reviewed.   Thrombocytopenia -- last , repeat  today, reviewed bleeding precautions, has hematology apt upcoming   Reassuring fetal status      Dispo:  Dc to Home   FU as scheduled in office   Return precautions reviewed     Marquita Coyne DO

## 2024-02-04 NOTE — PROGRESS NOTES
Dr. Coyne notified of CBC results. Patient may be discharged home on pelvic rest and to follow up with scheduled prenatal appointment on 2/9/24 with Dr. Boyle.

## 2024-02-04 NOTE — PROGRESS NOTES
Patient arrived to [facility] with reports of bleeding episode following SIC. Denies LOF, denies contractions and reports consistent fetal movement. Escorted to T2, provided gown and instructed to leave urine sample. Pt VS obtained and Dr. Coyne aware of patient arrival.

## 2024-02-04 NOTE — DISCHARGE INSTRUCTIONS
If you had a vaginal exam you may have some bloody mucous or brown vaginal discharge. Per Dr. Coyne you are to remain on pelvic rest until you are reevaluated by Dr. Boyle at your next prenatal appointment.     Follow-up care is a key part of your treatment and safety. Be sure to make and go to all appointments, and call your doctor if you are having problems. It's also a good idea to know your test results and keep a list of the medicines you take.     When should you call for help?   Call 911 anytime you think you may need emergency care. For example, call if:   You passed out (lost consciousness).   You have severe vaginal bleeding.   You have severe pain in your belly or pelvis.   You have had fluid gushing or leaking from your vagina and you know or think the umbilical cord is bulging into your vagina. If this happens, immediately get down on your knees so your rear end (buttocks) is higher than your head. This will decrease the pressure on the cord until help arrives.    Call your doctor now or seek immediate medical care if:   You have signs of preeclampsia, such as:   Sudden swelling of your face, hands, or feet.   New vision problems (such as dimness or blurring).   A severe headache.  You have change in amount or type of vaginal discharge  You have belly pain or cramping; low back or pelvic pressure; or pain up by your ribs that does not go away.  You have abdominal cramps that may be accompanied by diarrhea.  You have a fever- temperature of 100.6 or more.  You have had regular contractions (with or without pain) for an hour. This means that you have 8 or more within 1 hour or 4 or more in 20 minutes after you change your position and drink fluids.   You have a sudden release of fluid from the vagina.   You notice that your baby has stopped moving or is moving much less than normal- less than 5 times in 1 hour.  You have burning with urination, urgency or frequency

## 2024-02-05 ENCOUNTER — TELEPHONE (OUTPATIENT)
Dept: OBGYN CLINIC | Age: 24
End: 2024-02-05

## 2024-02-05 NOTE — TELEPHONE ENCOUNTER
Pt calling to let us know she has an amniocentesis scheduled for this Friday with  JEAN at 08:30 am. She saw Dr Coyne in triage over the weekend and was placed on pelvic rest due to some vaginal bleeding. She is asking how long she will need to be on pelvic rest. Pt 's appointment rescheduled to sooner date as she was on for Friday which conflicted with her MFM appointment.

## 2024-02-06 ENCOUNTER — ROUTINE PRENATAL (OUTPATIENT)
Dept: OBGYN CLINIC | Age: 24
End: 2024-02-06
Payer: COMMERCIAL

## 2024-02-06 VITALS
HEART RATE: 118 BPM | BODY MASS INDEX: 21.54 KG/M2 | DIASTOLIC BLOOD PRESSURE: 70 MMHG | WEIGHT: 121.6 LBS | TEMPERATURE: 99.3 F | SYSTOLIC BLOOD PRESSURE: 122 MMHG

## 2024-02-06 DIAGNOSIS — O09.899 MATERNAL VARICELLA, NON-IMMUNE: ICD-10-CM

## 2024-02-06 DIAGNOSIS — O46.92 VAGINAL BLEEDING IN PREGNANCY, SECOND TRIMESTER: ICD-10-CM

## 2024-02-06 DIAGNOSIS — O26.892 HEARTBURN DURING PREGNANCY IN SECOND TRIMESTER: ICD-10-CM

## 2024-02-06 DIAGNOSIS — R79.89 ELEVATED TSH: ICD-10-CM

## 2024-02-06 DIAGNOSIS — Z14.1 CYSTIC FIBROSIS CARRIER IN SECOND TRIMESTER, ANTEPARTUM: ICD-10-CM

## 2024-02-06 DIAGNOSIS — O41.8X10 SUBCHORIONIC HEMORRHAGE OF PLACENTA IN FIRST TRIMESTER, SINGLE OR UNSPECIFIED FETUS: ICD-10-CM

## 2024-02-06 DIAGNOSIS — F39 MOOD DISORDER (HCC): ICD-10-CM

## 2024-02-06 DIAGNOSIS — O46.8X1 SUBCHORIONIC HEMORRHAGE OF PLACENTA IN FIRST TRIMESTER, SINGLE OR UNSPECIFIED FETUS: ICD-10-CM

## 2024-02-06 DIAGNOSIS — Z34.02 ENCOUNTER FOR PRENATAL CARE IN SECOND TRIMESTER OF FIRST PREGNANCY: Primary | ICD-10-CM

## 2024-02-06 DIAGNOSIS — R12 HEARTBURN DURING PREGNANCY IN SECOND TRIMESTER: ICD-10-CM

## 2024-02-06 DIAGNOSIS — Z28.39 MATERNAL VARICELLA, NON-IMMUNE: ICD-10-CM

## 2024-02-06 DIAGNOSIS — D69.6 THROMBOCYTOPENIA (HCC): ICD-10-CM

## 2024-02-06 DIAGNOSIS — O09.892 CYSTIC FIBROSIS CARRIER IN SECOND TRIMESTER, ANTEPARTUM: ICD-10-CM

## 2024-02-06 DIAGNOSIS — O21.9 NAUSEA AND VOMITING IN PREGNANCY: ICD-10-CM

## 2024-02-06 PROCEDURE — 99213 OFFICE O/P EST LOW 20 MIN: CPT | Performed by: OBSTETRICS & GYNECOLOGY

## 2024-02-07 NOTE — PROGRESS NOTES
Return OB Office Visit    CC:   Chief Complaint   Patient presents with    Routine Prenatal Visit       HPI:  Patient seen and examined. Doing well today without complaints    Was recently seen in triage for bleeding after intercourse.  Has improved and is now brown discharge.  Denies LOF, ctx. +FM.  Denies headaches, vision changes, RUQ pain, increased LE edema.  Denies chest pain, shortness of breath, fever, chills, nausea, vomiting.      Has had anatomy scan with MFM.  Was found to have fetal echogenic bowel.  Patient is a carrier for CF and her partner has tested positive as a carrier as well.  Has amniocentesis Friday.     Review of Systems: The following ROS was otherwise negative, except as noted in the HPI: constitutional, HEENT, respiratory, cardiovascular, gastrointestinal, genitourinary, skin, musculoskeletal, neurological, psych    Objective:  /70   Pulse (!) 118   Temp 99.3 °F (37.4 °C) (Infrared)   Wt 55.2 kg (121 lb 9.6 oz)   LMP 09/08/2023 (Exact Date)   BMI 21.54 kg/m²     Physical Exam  Vitals reviewed.   Constitutional:       General: She is not in acute distress.     Appearance: She is well-developed.   HENT:      Head: Normocephalic and atraumatic.   Eyes:      Conjunctiva/sclera: Conjunctivae normal.   Cardiovascular:      Rate and Rhythm: Normal rate.   Pulmonary:      Effort: Pulmonary effort is normal. No respiratory distress.   Abdominal:      General: There is no distension.      Palpations: Abdomen is soft.      Tenderness: There is no abdominal tenderness. There is no guarding or rebound.   Musculoskeletal:         General: No swelling.   Skin:     General: Skin is warm and dry.   Neurological:      Mental Status: She is alert and oriented to person, place, and time.   Psychiatric:         Mood and Affect: Mood normal.         Behavior: Behavior normal.         Thought Content: Thought content normal.       Ultrasound  OB ULTRASOUND CERVICAL LENGTH     DATE: 2/6/24

## 2024-02-10 ENCOUNTER — HOSPITAL ENCOUNTER (OUTPATIENT)
Age: 24
Discharge: HOME | End: 2024-02-10
Attending: OBSTETRICS & GYNECOLOGY | Admitting: OBSTETRICS & GYNECOLOGY
Payer: COMMERCIAL

## 2024-02-10 VITALS
DIASTOLIC BLOOD PRESSURE: 66 MMHG | WEIGHT: 121.6 LBS | SYSTOLIC BLOOD PRESSURE: 109 MMHG | OXYGEN SATURATION: 98 % | BODY MASS INDEX: 21.55 KG/M2 | HEIGHT: 63 IN | HEART RATE: 89 BPM | RESPIRATION RATE: 16 BRPM | TEMPERATURE: 98.3 F

## 2024-02-10 LAB
BILIRUB UR QL STRIP.AUTO: NEGATIVE
CLARITY UR: CLEAR
COLOR UR: YELLOW
GLUCOSE UR STRIP.AUTO-MCNC: NEGATIVE MG/DL
HGB UR QL STRIP.AUTO: NEGATIVE
KETONES UR STRIP.AUTO-MCNC: NEGATIVE MG/DL
LEUKOCYTE ESTERASE UR QL STRIP.AUTO: NEGATIVE
NITRITE UR QL STRIP.AUTO: NEGATIVE
PH UR STRIP.AUTO: 6.5 [PH] (ref 5–8)
PROT UR STRIP.AUTO-MCNC: NEGATIVE MG/DL
SP GR UR STRIP.AUTO: 1.02 (ref 1–1.03)
UA DIPSTICK W REFLEX MICRO PNL UR: NORMAL
URN SPEC COLLECT METH UR: NORMAL
UROBILINOGEN UR STRIP-ACNC: 0.2 E.U./DL

## 2024-02-10 PROCEDURE — 99212 OFFICE O/P EST SF 10 MIN: CPT

## 2024-02-10 PROCEDURE — 81003 URINALYSIS AUTO W/O SCOPE: CPT

## 2024-02-10 PROCEDURE — 87086 URINE CULTURE/COLONY COUNT: CPT

## 2024-02-11 NOTE — PROGRESS NOTES
Dr. Barcenas notified of pt's arrival and assessment - orders received to send urine for UA and culture, PO hydrate pt and continue to monitor, will update with results of UA.

## 2024-02-11 NOTE — PROGRESS NOTES
Pt presents to triage with c/o R lower back pain/cramping since her amniocentesis yesterday. States she notified the o/c provider (Dr. Barcenas) and was told that this could be expected but if she felt it warranted being seen she could do so, and states she had rathered be seen earlier than later in the night. Has tried 1000mg tylenol at 0900 and 1400 with minimal relief. +FM, no VB or LOF. EFM applied.

## 2024-02-11 NOTE — PROGRESS NOTES
Pt verbalized understanding of verbal and written discharge instructions and denies having questions at this time. Pt left OB unit at 2236 ambulatory, undelivered, and in stable condition, unaccompanied. Patient is not in active labor.

## 2024-02-11 NOTE — DISCHARGE INSTRUCTIONS
Diet/Activity/Restrictions:  Regular  Limit caffeine consumption  Increase your fluid intake  Eat small meals-but often.    Rise from laying/sitting position to standing slowly.  Avoid laying on your back, sidelying positions are best, left side is preferred.  Weigh yourself daily and write down what you weigh.  If you had a vaginal exam you may have some bloody mucous or brown vaginal discharge.    When to call your doctor:  If you have severe back pain.  Period-like cramps that may come and go.  May feel like baby is balling up.  Low, dull backache, not relieved by rest.  Pressure in your vagina or lower abdomen that may feel like the baby is pushing down.  Change in the type or amount of vaginal discharge.  Abdominal cramps that may be accompanied by diarrhea.  4-5 uterine contractions in one hour.  Fluid leaking from your vagina (may or may not be bloody)  If you have vaginal bleeding.  If you have a temperature of 100.6 or more.  If your baby has a decrease in activity.  Less than 5 times in an hour.  If you feel you are not getting better or you are concerned.  If you develop a headache, not resolved with your usual interventions.  If you have changes in your vision (blurring or spots in your vision).  If you have burning with urination, urgency or frequency.  If you have pain in your abdomen, up by your ribs.    Nausea and Vomiting  Keep dry toast/crackers with you to munch on  Eat small frequent meals  Try to keep something in your stomach (don't let your stomach get empty)  Take your time getting up  Avoid smells that make you feel sick    Travel  Wear seatbelts or safety/lap belts  Walk frequently, every 1-2 hours  Wear clothing that does not constrict and comfortable shoes  Keep a light snack (e.g. Dry crackers) with you at all times to prevent nausea  Drink plenty of water, low sodium and noncaffeinated drinks.  DO NOT take any medication that is not approved by your physician first    Swelling

## 2024-02-11 NOTE — PROGRESS NOTES
Updated Dr. Barcenas on UA results WNL. Order received to have HO (Dr. Tee) evaluate patient and pending any recommendations otherwise okay to discharge pt to home.

## 2024-02-11 NOTE — H&P
[facility]  Labor and Delivery Triage Note        CHIEF COMPLAINT:  back pain    HISTORY OF PRESENT ILLNESS:      The patient is a 23 y.o. female 22w1d.    OB History          1    Para   0    Term   0       0    AB   0    Living   0         SAB   0    IAB   0    Ectopic   0    Molar   0    Multiple   0    Live Births   0              Patient presents complaining of back pain, s/p amniocentesis yesterday with [facility]..      She none.  She reports Normal fetal movement.  She denies vaginal bleeding.  She denies leakage of amniotic fluid     Estimated Due Date:  Estimated Date of Delivery: 24    PAST MEDICAL HISTORY:   Past Medical History:   Diagnosis Date    Depression     Dysmenorrhea 2017       PAST  SURGICAL HISTORY: History reviewed. No pertinent surgical history.    SOCIAL HISTORY:     reports that she quit smoking about 4 years ago. Her smoking use included cigarettes. She started smoking about 5 years ago. She has a 0.3 pack-year smoking history. She has never used smokeless tobacco. She reports that she does not currently use alcohol. She reports that she does not use drugs.     MEDICATIONS:    Prior to Admission medications    Medication Sig Start Date End Date Taking? Authorizing Provider   famotidine (PEPCID) 20 MG tablet Take 1 tablet by mouth 2 times daily 23   Yessica Boyle,    ondansetron (ZOFRAN-ODT) 4 MG disintegrating tablet Take 1 tablet by mouth every 8 hours as needed for Nausea 23   Yessica Boyle,    fluticasone (FLONASE) 50 MCG/ACT nasal spray 2 sprays by Each Nostril route daily 22   Brie Luciano DO        PRENATAL CARE:    Complicated by: CF carrier, mild thrombocytopenia    REVIEW OF SYSTEMS:     Pertinent items are noted in HPI.    PHYSICAL EXAM:    Vital Signs: Blood pressure 109/66, pulse 89, temperature 98.3 °F (36.8 °C), temperature source Oral, resp. rate 16, height 1.6 m (5' 3\"), weight 55.2 kg (121 lb 9.6 oz), last

## 2024-02-12 LAB — BACTERIA UR CULT: NORMAL

## 2024-03-06 ENCOUNTER — ROUTINE PRENATAL (OUTPATIENT)
Dept: OBGYN CLINIC | Age: 24
End: 2024-03-06

## 2024-03-06 VITALS
SYSTOLIC BLOOD PRESSURE: 112 MMHG | DIASTOLIC BLOOD PRESSURE: 64 MMHG | BODY MASS INDEX: 22.5 KG/M2 | TEMPERATURE: 98 F | HEART RATE: 97 BPM | WEIGHT: 127 LBS

## 2024-03-06 DIAGNOSIS — O21.9 NAUSEA AND VOMITING IN PREGNANCY: ICD-10-CM

## 2024-03-06 DIAGNOSIS — Z14.1 CYSTIC FIBROSIS CARRIER IN SECOND TRIMESTER, ANTEPARTUM: ICD-10-CM

## 2024-03-06 DIAGNOSIS — O26.892 HEARTBURN DURING PREGNANCY IN SECOND TRIMESTER: ICD-10-CM

## 2024-03-06 DIAGNOSIS — F39 MOOD DISORDER (HCC): ICD-10-CM

## 2024-03-06 DIAGNOSIS — O09.892 CYSTIC FIBROSIS CARRIER IN SECOND TRIMESTER, ANTEPARTUM: ICD-10-CM

## 2024-03-06 DIAGNOSIS — Z34.02 ENCOUNTER FOR PRENATAL CARE IN SECOND TRIMESTER OF FIRST PREGNANCY: Primary | ICD-10-CM

## 2024-03-06 DIAGNOSIS — O46.92 VAGINAL BLEEDING IN PREGNANCY, SECOND TRIMESTER: ICD-10-CM

## 2024-03-06 DIAGNOSIS — O41.8X10 SUBCHORIONIC HEMORRHAGE OF PLACENTA IN FIRST TRIMESTER, SINGLE OR UNSPECIFIED FETUS: ICD-10-CM

## 2024-03-06 DIAGNOSIS — Z28.39 MATERNAL VARICELLA, NON-IMMUNE: ICD-10-CM

## 2024-03-06 DIAGNOSIS — R79.89 ELEVATED TSH: ICD-10-CM

## 2024-03-06 DIAGNOSIS — R12 HEARTBURN DURING PREGNANCY IN SECOND TRIMESTER: ICD-10-CM

## 2024-03-06 DIAGNOSIS — D69.6 THROMBOCYTOPENIA (HCC): ICD-10-CM

## 2024-03-06 DIAGNOSIS — O09.899 MATERNAL VARICELLA, NON-IMMUNE: ICD-10-CM

## 2024-03-06 DIAGNOSIS — O46.8X1 SUBCHORIONIC HEMORRHAGE OF PLACENTA IN FIRST TRIMESTER, SINGLE OR UNSPECIFIED FETUS: ICD-10-CM

## 2024-03-06 NOTE — PROGRESS NOTES
second trimester of first pregnancy     - Doing well today without complaints     - Fetal wellbeing reassuring by FHR today     - Continue PNV     - PNL: O positive/ab negative, R Immune, Varicella non-immune, HepB non-reactive, HepC negative, HIV non-reactive, Syphilis non-reactive, TSH 3.8 (has upcoming appt with Endocrinology), Hgb 13.6, Plt 134, UDS needs collected, UCx < 10,000 mixed yovany, GCCT neg/neg, Pap NILM 12/6/2022     - Anatomy: With MFM - fetal echogenic bowel     - Aneuploidy screen: [id number] negative, female      - Carrier screen: CF carrier, FOB positive     - AFP: Declines     - Pregnancy physiology and precautions reviewed     - Return precautions reviewed      - Will follow-up in 3 weeks for JANNET visit and glucose screen      2. Subchorionic hemorrhage of placenta in first trimester, single or unspecified fetus     - Has had vaginal bleeding secondary to sub-chorionic hemorrhage     - Subchorionic bleed seen measuring 1.51 x 1.99 x 1.87 cm at last visit - resolving on repeat imaging     - Bleeding had resolved until recently with intercourse     - Reviewed pelvic rest with patient     - Findings and sequelae reviewed     - Return precautions reviewed      - Will continue to follow      3. Vaginal bleeding in pregnancy     - See above      4. Mood disorder (HCC)     - Maternal wellness questionnaire reviewed     - Doing well off SSRI (Vibryd) - discontinued use     - Does not desire to restart medications at this time     - Will schedule with Yris     - Will continue to follow      5. Heartburn in pregnancy     - Doing well with Pepcid     - Return precautions reviewed     6. Nausea and vomiting in pregnancy     - Improving nausea and vomiting     - Reviewed conservative measures     - Reviewed importance of increased hydration and small frequent meals     - Rx provided for Zofran - continue prn      - Return precautions reviewed      7. Thrombocytopenia (HCC)     - Plts 126     - Stable from

## 2024-04-03 ENCOUNTER — ROUTINE PRENATAL (OUTPATIENT)
Dept: OBGYN CLINIC | Age: 24
End: 2024-04-03

## 2024-04-03 VITALS
WEIGHT: 129.2 LBS | DIASTOLIC BLOOD PRESSURE: 62 MMHG | SYSTOLIC BLOOD PRESSURE: 100 MMHG | HEART RATE: 97 BPM | BODY MASS INDEX: 22.89 KG/M2

## 2024-04-03 DIAGNOSIS — F39 MOOD DISORDER (HCC): ICD-10-CM

## 2024-04-03 DIAGNOSIS — R12 HEARTBURN DURING PREGNANCY IN THIRD TRIMESTER: ICD-10-CM

## 2024-04-03 DIAGNOSIS — Z34.03 ENCOUNTER FOR PRENATAL CARE IN THIRD TRIMESTER OF FIRST PREGNANCY: Primary | ICD-10-CM

## 2024-04-03 DIAGNOSIS — R79.89 ELEVATED TSH: ICD-10-CM

## 2024-04-03 DIAGNOSIS — D69.6 THROMBOCYTOPENIA (HCC): ICD-10-CM

## 2024-04-03 DIAGNOSIS — O09.899 MATERNAL VARICELLA, NON-IMMUNE: ICD-10-CM

## 2024-04-03 DIAGNOSIS — O46.90 VAGINAL BLEEDING IN PREGNANCY: ICD-10-CM

## 2024-04-03 DIAGNOSIS — O09.893 CYSTIC FIBROSIS CARRIER IN THIRD TRIMESTER, ANTEPARTUM: ICD-10-CM

## 2024-04-03 DIAGNOSIS — O21.9 NAUSEA AND VOMITING IN PREGNANCY: ICD-10-CM

## 2024-04-03 DIAGNOSIS — O26.892 VAGINAL DISCHARGE DURING PREGNANCY IN SECOND TRIMESTER: ICD-10-CM

## 2024-04-03 DIAGNOSIS — Z14.1 CYSTIC FIBROSIS CARRIER IN THIRD TRIMESTER, ANTEPARTUM: ICD-10-CM

## 2024-04-03 DIAGNOSIS — N89.8 VAGINAL DISCHARGE DURING PREGNANCY IN SECOND TRIMESTER: ICD-10-CM

## 2024-04-03 DIAGNOSIS — O20.8 SUBCHORIONIC HEMORRHAGE IN FIRST TRIMESTER: ICD-10-CM

## 2024-04-03 DIAGNOSIS — O26.893 HEARTBURN DURING PREGNANCY IN THIRD TRIMESTER: ICD-10-CM

## 2024-04-03 DIAGNOSIS — Z28.39 MATERNAL VARICELLA, NON-IMMUNE: ICD-10-CM

## 2024-04-03 NOTE — PROGRESS NOTES
.eastobgynglucose    Patient started drink at 10:06 and finished at  10:09.  Patient tolerated drink well.   1 green tube drawn at 11:09 .  # 50 grams of Glucola.  No complain of nausea and vomiting at this time, will continue to monitor.     
Temp: 97.8    12:13 PM Given Tdap (Adacel) vaccine 0.5mL IM  Site:Right deltoid.   Lot # [id number]  Expiration Date:  05/03/2026  NDC # 86458-968-22 .  Patient tolerated well. No reaction noted after 20 minutes. VIS sheet provided.  Administered by: Yessica Daley MA   
alert and oriented to person, place, and time.   Psychiatric:         Mood and Affect: Mood normal.         Behavior: Behavior normal.         Thought Content: Thought content normal.       Ultrasound  OBSTETRICAL ULTRASOUND GROWTH     DATE: 04/03/2024     PHYSICIAN: EDIS Boyle D.O.      SONOGRAPHER: KELLI Singh MS     INDICATION: Growth,      TYPE OF SCAN: abdominal     FINDINGS:  A single viable intrauterine pregnancy is noted in cephalic presentation. Cardiac and somatic activity are noted.     The following values were obtained:                Fetal heart rate                                               142bpm                BPD                                                                 7.37cm                        32.9 %              Head Circumference                                       28.87cm                      75.2 %               Abdominal Circumference                              25.56cm                      45.0 %              Femur Length                                                  5.89cm                        64.8 %                Amniotic fluid index                                         12.40cm                EFW                                                                1537g              55.9 percentile     Amniotic fluid volume is normal. Based on sonographic criteria the estimated fetal age is 30weeks and 3days with EDC of 06/09/2024. There is a 5 day discordance with the established EDC of 06/14/2024.      The patient has an anterior posterior fundal placenta that is adequate distance in relation to the internal cervical os. The evaluation of the lower uterine segment and cervix reveals normal appearing anatomy.  The uterus is unremarkable/gravid.  Maternal ovaries and adnexae are not well visualized due to the size of the uterus and patient's gravid state.     Anatomy seen includes: heart, stomach, kidneys, bladder     IMPRESSION:  Single live IUP in the third trimester.

## 2024-04-04 ENCOUNTER — NURSE ONLY (OUTPATIENT)
Dept: OBGYN CLINIC | Age: 24
End: 2024-04-04
Payer: COMMERCIAL

## 2024-04-04 DIAGNOSIS — O09.93 HIGH-RISK PREGNANCY IN THIRD TRIMESTER: Primary | ICD-10-CM

## 2024-04-04 PROCEDURE — 36415 COLL VENOUS BLD VENIPUNCTURE: CPT | Performed by: OBSTETRICS & GYNECOLOGY

## 2024-04-05 LAB
BASOPHILS # BLD: 0 K/UL (ref 0–0.2)
BASOPHILS NFR BLD: 0.4 %
DEPRECATED RDW RBC AUTO: 13.8 % (ref 12.4–15.4)
EOSINOPHIL # BLD: 0.1 K/UL (ref 0–0.6)
EOSINOPHIL NFR BLD: 1.5 %
GLUCOSE 1H P 50 G GLC PO SERPL-MCNC: 81 MG/DL
HCT VFR BLD AUTO: 31.4 % (ref 36–48)
HGB BLD-MCNC: 10.9 G/DL (ref 12–16)
LYMPHOCYTES # BLD: 1.1 K/UL (ref 1–5.1)
LYMPHOCYTES NFR BLD: 12.9 %
MCH RBC QN AUTO: 32.3 PG (ref 26–34)
MCHC RBC AUTO-ENTMCNC: 34.9 G/DL (ref 31–36)
MCV RBC AUTO: 92.7 FL (ref 80–100)
MONOCYTES # BLD: 0.6 K/UL (ref 0–1.3)
MONOCYTES NFR BLD: 6.5 %
NEUTROPHILS # BLD: 6.9 K/UL (ref 1.7–7.7)
NEUTROPHILS NFR BLD: 78.7 %
PLATELET # BLD AUTO: 104 K/UL (ref 135–450)
PMV BLD AUTO: 8.9 FL (ref 5–10.5)
RBC # BLD AUTO: 3.38 M/UL (ref 4–5.2)
WBC # BLD AUTO: 8.8 K/UL (ref 4–11)

## 2024-04-07 PROBLEM — O26.893 HEARTBURN DURING PREGNANCY IN THIRD TRIMESTER: Status: ACTIVE | Noted: 2023-11-10

## 2024-04-07 PROBLEM — Z34.03 ENCOUNTER FOR PRENATAL CARE IN THIRD TRIMESTER OF FIRST PREGNANCY: Status: ACTIVE | Noted: 2023-11-10

## 2024-04-07 PROBLEM — O09.893 CYSTIC FIBROSIS CARRIER IN THIRD TRIMESTER, ANTEPARTUM: Status: ACTIVE | Noted: 2024-01-17

## 2024-04-16 ENCOUNTER — TELEPHONE (OUTPATIENT)
Dept: OBGYN CLINIC | Age: 24
End: 2024-04-16

## 2024-04-16 NOTE — TELEPHONE ENCOUNTER
Would Dr. Boyle know the probability of the side effects happening in baby or mom?    Routing to Dr. Boyle.

## 2024-04-16 NOTE — TELEPHONE ENCOUNTER
Okay for Zoloft in pregnancy.  There is a risk postpartum of  withdrawal and some providers will recommend weaning in the third trimester.  There is also a small risk of high blood pressure in baby's lungs with SSRI use in pregnancy.  Even with these risks we recommend treating anxiety/depression symptoms in pregnant women and Zoloft is the medication we have the most safety information on.  I would not have hesitation to start Zoloft.  However if she desires to discuss with [facility] when she establishes this would be reasonable as well.  Thank you!

## 2024-04-16 NOTE — TELEPHONE ENCOUNTER
Patient is wanting to start on the zoloft 25mg, she is just wanting to confirm that it is okay to start on it. She saw her physiatrists and he will prescribe, patient just wanting to verify it is okay to take.     Routing to Dr. Boyle.

## 2024-04-17 NOTE — TELEPHONE ENCOUNTER
These chances are very low as there is a very low concentration of medication that actually reaches fetal circulation with Zoloft.  This is why we recommend this as first line in pregnancy.  Thank you.

## 2024-04-24 ENCOUNTER — TELEPHONE (OUTPATIENT)
Dept: OBGYN CLINIC | Age: 24
End: 2024-04-24

## 2024-04-24 NOTE — TELEPHONE ENCOUNTER
Patient scheduled per Dr. Boyle, will place referrals at the appointment, patient aware.    Routing to Dr. Boyle.

## 2024-04-24 NOTE — TELEPHONE ENCOUNTER
Emailed LANDY form to pt at [email or web address] so that we can obtain record from visit with Nurse midwife.

## 2024-04-24 NOTE — TELEPHONE ENCOUNTER
Pt calling to ask if you would issue a referral to  as she would like to deliver there. She says she had been trying to get in to [facility] but is having difficulty. Please advise    Spoke with Denise, She did have an appointment at [facility] with a Nurse Midwife. She was unaware that she was seeing her and not a Physician. She has had no further appointments since that visit. She will try to send the visit notes from that provider to our office.  Spoke with MFM, They stated if pt desires to be followed there we would need to send the order for transfer of care with current OB chart.

## 2024-04-26 ENCOUNTER — ROUTINE PRENATAL (OUTPATIENT)
Dept: OBGYN CLINIC | Age: 24
End: 2024-04-26
Payer: COMMERCIAL

## 2024-04-26 VITALS
BODY MASS INDEX: 23.49 KG/M2 | DIASTOLIC BLOOD PRESSURE: 68 MMHG | HEART RATE: 98 BPM | TEMPERATURE: 97.8 F | WEIGHT: 132.6 LBS | SYSTOLIC BLOOD PRESSURE: 127 MMHG

## 2024-04-26 DIAGNOSIS — F39 MOOD DISORDER (HCC): ICD-10-CM

## 2024-04-26 DIAGNOSIS — R12 HEARTBURN DURING PREGNANCY IN THIRD TRIMESTER: ICD-10-CM

## 2024-04-26 DIAGNOSIS — E03.9 HYPOTHYROIDISM AFFECTING PREGNANCY IN THIRD TRIMESTER: ICD-10-CM

## 2024-04-26 DIAGNOSIS — O09.899 MATERNAL VARICELLA, NON-IMMUNE: ICD-10-CM

## 2024-04-26 DIAGNOSIS — Z34.03 ENCOUNTER FOR PRENATAL CARE IN THIRD TRIMESTER OF FIRST PREGNANCY: Primary | ICD-10-CM

## 2024-04-26 DIAGNOSIS — O99.283 HYPOTHYROIDISM AFFECTING PREGNANCY IN THIRD TRIMESTER: ICD-10-CM

## 2024-04-26 DIAGNOSIS — O26.893 HEARTBURN DURING PREGNANCY IN THIRD TRIMESTER: ICD-10-CM

## 2024-04-26 DIAGNOSIS — Z28.39 MATERNAL VARICELLA, NON-IMMUNE: ICD-10-CM

## 2024-04-26 DIAGNOSIS — D69.6 THROMBOCYTOPENIA (HCC): ICD-10-CM

## 2024-04-26 DIAGNOSIS — O20.8 SUBCHORIONIC HEMORRHAGE IN FIRST TRIMESTER: ICD-10-CM

## 2024-04-26 DIAGNOSIS — O21.9 NAUSEA AND VOMITING IN PREGNANCY: ICD-10-CM

## 2024-04-26 DIAGNOSIS — O46.92 VAGINAL BLEEDING IN PREGNANCY, SECOND TRIMESTER: ICD-10-CM

## 2024-04-26 DIAGNOSIS — Z14.1 CYSTIC FIBROSIS CARRIER IN THIRD TRIMESTER, ANTEPARTUM: ICD-10-CM

## 2024-04-26 DIAGNOSIS — O09.893 CYSTIC FIBROSIS CARRIER IN THIRD TRIMESTER, ANTEPARTUM: ICD-10-CM

## 2024-04-26 PROCEDURE — 99213 OFFICE O/P EST LOW 20 MIN: CPT | Performed by: OBSTETRICS & GYNECOLOGY

## 2024-04-26 PROCEDURE — G8420 CALC BMI NORM PARAMETERS: HCPCS | Performed by: OBSTETRICS & GYNECOLOGY

## 2024-04-26 PROCEDURE — 1036F TOBACCO NON-USER: CPT | Performed by: OBSTETRICS & GYNECOLOGY

## 2024-04-26 PROCEDURE — G8427 DOCREV CUR MEDS BY ELIG CLIN: HCPCS | Performed by: OBSTETRICS & GYNECOLOGY

## 2024-04-26 PROCEDURE — 36415 COLL VENOUS BLD VENIPUNCTURE: CPT | Performed by: OBSTETRICS & GYNECOLOGY

## 2024-04-27 LAB — TSH SERPL DL<=0.005 MIU/L-ACNC: 2.91 UIU/ML (ref 0.27–4.2)

## 2024-04-29 PROBLEM — O99.283 HYPOTHYROIDISM AFFECTING PREGNANCY IN THIRD TRIMESTER: Status: ACTIVE | Noted: 2024-04-29

## 2024-04-29 PROBLEM — E03.9 HYPOTHYROIDISM AFFECTING PREGNANCY IN THIRD TRIMESTER: Status: ACTIVE | Noted: 2024-04-29

## 2024-04-29 PROBLEM — R79.89 ELEVATED TSH: Status: RESOLVED | Noted: 2023-11-10 | Resolved: 2024-04-29

## 2024-04-29 RX ORDER — LEVOTHYROXINE SODIUM 0.03 MG/1
25 TABLET ORAL DAILY
Qty: 30 TABLET | Refills: 0 | Status: ON HOLD | OUTPATIENT
Start: 2024-04-29

## 2024-04-30 NOTE — PROGRESS NOTES
Return OB Office Visit    CC:   Chief Complaint   Patient presents with    Routine Prenatal Visit       HPI:  Patient seen and examined. Doing well today without complaints    Desired to transfer care to [facility].  Was seen by their midwives, however as she was unable to follow with a physician she did not want to continue to follow.  She tried to get into the physician practice and she was going to have to follow with the residents and she does not want to do this either.  Is considering transfer of care to  for delivery.    Denies LOF, VB ctx. +FM.  Denies headaches, vision changes, RUQ pain, increased LE edema.  Denies chest pain, shortness of breath, fever, chills, nausea, vomiting.  Continues to have some acid reflux.    Has seen her Psychiatrist who recommended start of Zoloft.  Has not started yet.    Has had anatomy scan with [facility].  Was found to have fetal echogenic bowel.  Patient is a carrier for CF and her partner has tested positive as a carrier as well.  Amniocentesis showed baby is a carrier for CF.  Desires transfer to [facility] to deliver with higher level nursery care, has appt scheduled in 2 weeks.     Review of Systems: The following ROS was otherwise negative, except as noted in the HPI: constitutional, HEENT, respiratory, cardiovascular, gastrointestinal, genitourinary, skin, musculoskeletal, neurological, psych    Objective:  /68   Pulse 98   Temp 97.8 °F (36.6 °C) (Temporal)   Wt 60.1 kg (132 lb 9.6 oz)   LMP 09/08/2023 (Exact Date)   BMI 23.49 kg/m²     Physical Exam  Vitals reviewed.   Constitutional:       General: She is not in acute distress.     Appearance: She is well-developed.   HENT:      Head: Normocephalic and atraumatic.   Eyes:      Conjunctiva/sclera: Conjunctivae normal.   Cardiovascular:      Rate and Rhythm: Normal rate.   Pulmonary:      Effort: Pulmonary effort is normal. No respiratory distress.   Abdominal:      General: There is no distension.

## 2024-05-01 ENCOUNTER — TELEPHONE (OUTPATIENT)
Dept: OBGYN CLINIC | Age: 24
End: 2024-05-01

## 2024-05-01 ENCOUNTER — HOSPITAL ENCOUNTER (OUTPATIENT)
Age: 24
Discharge: HOME | End: 2024-05-01
Attending: OBSTETRICS & GYNECOLOGY | Admitting: OBSTETRICS & GYNECOLOGY
Payer: COMMERCIAL

## 2024-05-01 VITALS
HEIGHT: 63 IN | RESPIRATION RATE: 16 BRPM | SYSTOLIC BLOOD PRESSURE: 124 MMHG | TEMPERATURE: 98.2 F | HEART RATE: 93 BPM | DIASTOLIC BLOOD PRESSURE: 82 MMHG | WEIGHT: 132 LBS | BODY MASS INDEX: 23.39 KG/M2

## 2024-05-01 PROCEDURE — 99211 OFF/OP EST MAY X REQ PHY/QHP: CPT

## 2024-05-01 RX ORDER — FERROUS SULFATE 325(65) MG
325 TABLET ORAL
Status: ON HOLD | COMMUNITY

## 2024-05-01 NOTE — TELEPHONE ENCOUNTER
Patient has not felt baby move today. She ate a bagel and hydrated this morning, has been waiting on movement and nothing today, baby was moving good yesterday. Patient will report to L & D for triage, about 10 minutes away from the hospital. L&D triage aware.     Routing to Dr. Boyle and provider on call Dr. Barcenas.

## 2024-05-01 NOTE — PROGRESS NOTES
Pt educated on verbal/written discharge instructions. Pt verbalized understanding and denies questions/concerns at this time. Pt able to ambulate off unit, undelivered, not in labor and in stable condition.

## 2024-05-01 NOTE — PROGRESS NOTES
Dr. Barcenas notified of pt arrival and SBAR. Pt is now feeling the baby move. MD reviewed tracing and is okay with pt being seen by house MD and then going home.

## 2024-05-01 NOTE — H&P
Resource Strain: Not on file   Food Insecurity: Not on file   Transportation Needs: Not on file   Physical Activity: Not on file   Stress: Not on file   Social Connections: Not on file   Intimate Partner Violence: Not on file   Housing Stability: Not on file     Family History:       Adopted: Yes     Medications Prior to Admission:  Medications Prior to Admission: ferrous sulfate (IRON 325) 325 (65 Fe) MG tablet, Take 1 tablet by mouth daily (with breakfast)  levothyroxine (SYNTHROID) 25 MCG tablet, Take 1 tablet by mouth daily  Prenatal MV-Min-Fe Fum-FA-DHA (PRENATAL 1 PO), Take by mouth  famotidine (PEPCID) 20 MG tablet, Take 1 tablet by mouth 2 times daily  fluticasone (FLONASE) 50 MCG/ACT nasal spray, 2 sprays by Each Nostril route daily    REVIEW OF SYSTEMS:    wnl    PHYSICAL EXAM:  Vitals:    05/01/24 1346 05/01/24 1347   BP: 124/82    Pulse: 93    Resp: 16    Temp: 98.2 °F (36.8 °C)    TempSrc: Oral    Weight:  59.9 kg (132 lb)   Height:  1.6 m (5' 3\")     General appearance:  awake, alert, cooperative, no apparent distress, and appears stated age  Neurologic:  Awake, alert, oriented to name, place and time.    Lungs:  No increased work of breathing, good air exchange  Abdomen:  Soft, non tender, gravid, consistent with her gestational age, EFW by Leopold's maneuver was 5#   Fetal heart rate:  Reassuring. NST-R  Contraction frequency:  irregular-pt does not appreciate  Membranes:  Intact      ASSESSMENT AND PLAN:    Decreased FM @ 33 wks  NST-R. Pt feels adequate FM @ this time  C reviewed  Plan per Dr. Narinder Flanagan MD

## 2024-05-01 NOTE — PROGRESS NOTES
PT presented to OB triage unit with complaints of not feeling fetal movement at all today. Oriented to room and telephone. Pt provided urine sample and was placed on EFM. Denies any leaking of fluid or vaginal bleeding. Pt denies feeling ctx. Denies any additional pain/concerns at this time.

## 2024-05-05 ENCOUNTER — HOSPITAL ENCOUNTER (OUTPATIENT)
Age: 24
Setting detail: OBSERVATION
Discharge: HOME | End: 2024-05-07
Attending: OBSTETRICS & GYNECOLOGY | Admitting: OBSTETRICS & GYNECOLOGY
Payer: COMMERCIAL

## 2024-05-05 ENCOUNTER — APPOINTMENT (OUTPATIENT)
Dept: ULTRASOUND IMAGING | Age: 24
End: 2024-05-05
Payer: COMMERCIAL

## 2024-05-05 LAB
ABO + RH BLD: NORMAL
APTT BLD: 24.4 SEC (ref 22.1–36.4)
BACTERIA URNS QL MICRO: ABNORMAL /HPF
BASOPHILS # BLD: 0 K/UL (ref 0–0.2)
BASOPHILS NFR BLD: 0.2 %
BILIRUB UR QL STRIP.AUTO: NEGATIVE
BLD GP AB SCN SERPL QL: NORMAL
CLARITY UR: CLEAR
COLOR UR: YELLOW
DEPRECATED RDW RBC AUTO: 13.6 % (ref 12.4–15.4)
EOSINOPHIL # BLD: 0.1 K/UL (ref 0–0.6)
EOSINOPHIL NFR BLD: 1.2 %
EPI CELLS #/AREA URNS HPF: ABNORMAL /HPF (ref 0–5)
FIBRINOGEN PPP-MCNC: 407 MG/DL (ref 227–534)
FIBRONECTIN FETAL VAG QL: NEGATIVE
GLUCOSE UR STRIP.AUTO-MCNC: NEGATIVE MG/DL
HCT VFR BLD AUTO: 32.2 % (ref 36–48)
HGB BLD-MCNC: 11.4 G/DL (ref 12–16)
HGB UR QL STRIP.AUTO: NEGATIVE
INR PPP: 1.09 (ref 0.85–1.15)
KETONES UR STRIP.AUTO-MCNC: NEGATIVE MG/DL
LEUKOCYTE ESTERASE UR QL STRIP.AUTO: ABNORMAL
LYMPHOCYTES # BLD: 1.4 K/UL (ref 1–5.1)
LYMPHOCYTES NFR BLD: 13.5 %
MCH RBC QN AUTO: 31.3 PG (ref 26–34)
MCHC RBC AUTO-ENTMCNC: 35.4 G/DL (ref 31–36)
MCV RBC AUTO: 88.4 FL (ref 80–100)
MONOCYTES # BLD: 0.7 K/UL (ref 0–1.3)
MONOCYTES NFR BLD: 6.2 %
NEUTROPHILS # BLD: 8.4 K/UL (ref 1.7–7.7)
NEUTROPHILS NFR BLD: 78.9 %
NITRITE UR QL STRIP.AUTO: NEGATIVE
PH UR STRIP.AUTO: 6 [PH] (ref 5–8)
PLATELET # BLD AUTO: 112 K/UL (ref 135–450)
PMV BLD AUTO: 8.1 FL (ref 5–10.5)
PROT UR STRIP.AUTO-MCNC: NEGATIVE MG/DL
PROTHROMBIN TIME: 14.3 SEC (ref 11.9–14.9)
RBC # BLD AUTO: 3.64 M/UL (ref 4–5.2)
RBC #/AREA URNS HPF: ABNORMAL /HPF (ref 0–4)
SP GR UR STRIP.AUTO: 1.02 (ref 1–1.03)
UA DIPSTICK W REFLEX MICRO PNL UR: YES
URN SPEC COLLECT METH UR: ABNORMAL
UROBILINOGEN UR STRIP-ACNC: 0.2 E.U./DL
WBC # BLD AUTO: 10.6 K/UL (ref 4–11)
WBC #/AREA URNS HPF: ABNORMAL /HPF (ref 0–5)

## 2024-05-05 PROCEDURE — 82731 ASSAY OF FETAL FIBRONECTIN: CPT

## 2024-05-05 PROCEDURE — 81001 URINALYSIS AUTO W/SCOPE: CPT

## 2024-05-05 PROCEDURE — 86850 RBC ANTIBODY SCREEN: CPT

## 2024-05-05 PROCEDURE — 85384 FIBRINOGEN ACTIVITY: CPT

## 2024-05-05 PROCEDURE — 86900 BLOOD TYPING SEROLOGIC ABO: CPT

## 2024-05-05 PROCEDURE — 76805 OB US >/= 14 WKS SNGL FETUS: CPT

## 2024-05-05 PROCEDURE — 86901 BLOOD TYPING SEROLOGIC RH(D): CPT

## 2024-05-05 PROCEDURE — 99221 1ST HOSP IP/OBS SF/LOW 40: CPT | Performed by: OBSTETRICS & GYNECOLOGY

## 2024-05-05 PROCEDURE — 80307 DRUG TEST PRSMV CHEM ANLYZR: CPT

## 2024-05-05 PROCEDURE — 85610 PROTHROMBIN TIME: CPT

## 2024-05-05 PROCEDURE — 85025 COMPLETE CBC W/AUTO DIFF WBC: CPT

## 2024-05-05 PROCEDURE — 85730 THROMBOPLASTIN TIME PARTIAL: CPT

## 2024-05-05 PROCEDURE — 76817 TRANSVAGINAL US OBSTETRIC: CPT

## 2024-05-05 PROCEDURE — 76819 FETAL BIOPHYS PROFIL W/O NST: CPT

## 2024-05-05 PROCEDURE — 96361 HYDRATE IV INFUSION ADD-ON: CPT

## 2024-05-05 PROCEDURE — 96374 THER/PROPH/DIAG INJ IV PUSH: CPT

## 2024-05-05 PROCEDURE — 87653 STREP B DNA AMP PROBE: CPT

## 2024-05-05 PROCEDURE — 6360000002 HC RX W HCPCS: Performed by: OBSTETRICS & GYNECOLOGY

## 2024-05-05 PROCEDURE — 2580000003 HC RX 258: Performed by: OBSTETRICS & GYNECOLOGY

## 2024-05-05 RX ORDER — SODIUM CHLORIDE, SODIUM LACTATE, POTASSIUM CHLORIDE, CALCIUM CHLORIDE 600; 310; 30; 20 MG/100ML; MG/100ML; MG/100ML; MG/100ML
INJECTION, SOLUTION INTRAVENOUS CONTINUOUS
Status: DISCONTINUED | OUTPATIENT
Start: 2024-05-05 | End: 2024-05-07 | Stop reason: HOSPADM

## 2024-05-05 RX ORDER — BETAMETHASONE SODIUM PHOSPHATE AND BETAMETHASONE ACETATE 3; 3 MG/ML; MG/ML
12 INJECTION, SUSPENSION INTRA-ARTICULAR; INTRALESIONAL; INTRAMUSCULAR; SOFT TISSUE EVERY 24 HOURS
Status: COMPLETED | OUTPATIENT
Start: 2024-05-05 | End: 2024-05-06

## 2024-05-05 RX ORDER — SODIUM CHLORIDE, SODIUM LACTATE, POTASSIUM CHLORIDE, AND CALCIUM CHLORIDE .6; .31; .03; .02 G/100ML; G/100ML; G/100ML; G/100ML
1000 INJECTION, SOLUTION INTRAVENOUS ONCE
Status: COMPLETED | OUTPATIENT
Start: 2024-05-05 | End: 2024-05-05

## 2024-05-05 RX ADMIN — SODIUM CHLORIDE, POTASSIUM CHLORIDE, SODIUM LACTATE AND CALCIUM CHLORIDE: 600; 310; 30; 20 INJECTION, SOLUTION INTRAVENOUS at 21:30

## 2024-05-05 RX ADMIN — BETAMETHASONE SODIUM PHOSPHATE AND BETAMETHASONE ACETATE 12 MG: 3; 3 INJECTION, SUSPENSION INTRA-ARTICULAR; INTRALESIONAL; INTRAMUSCULAR at 21:45

## 2024-05-05 RX ADMIN — SODIUM CHLORIDE, POTASSIUM CHLORIDE, SODIUM LACTATE AND CALCIUM CHLORIDE 1000 ML: 600; 310; 30; 20 INJECTION, SOLUTION INTRAVENOUS at 20:10

## 2024-05-05 NOTE — H&P
Obstetrics History and Physical    CC:   Chief Complaint   Patient presents with    Fall     Fall around 1800. Twisted R ankle fell on R hip/abdomen area. No pain. Denies VB, LOF. Has felt baby move since fall. Pt states she is not concerned about ankle, just has anxiety about  making sure baby is okay.       HPI: Denise Moore is a 24 y.o.  at 34w2d who presents with complaints of fall at approximately 1800.      Reports she had her baby shower today.  Was walking through the kitchen, tripped, rolled her ankle and landed on her leg, hip and abdomen.  Has had some cramping since that time.  Denies VB, LOF.  +FM.  Denies chest pain, shortness of breath, fever, chills, nausea, vomiting.     Has a cough and congestion, spoke with patient through answering service yesterday and started Mucinex.       Pregnancy has been complicate by CF carrier (both patient and fetus), vaginal bleeding in early pregnancy, anxiety, thrombocytopenia, hypothyroidism.     Review of Systems: The following ROS was otherwise negative, except as noted in the HPI: constitutional, HEENT, respiratory, cardiovascular, gastrointestinal, genitourinary, skin, musculoskeletal, neurological, psych    OBGYN Provider : EDIS Boyle DO     Obstetrical History:  OB History    Para Term  AB Living   1 0 0 0 0 0   SAB IAB Ectopic Molar Multiple Live Births   0 0 0 0 0 0      # Outcome Date GA Lbr Rogers/2nd Weight Sex Delivery Anes PTL Lv   1 Current              Past Medical History:   Past Medical History:   Diagnosis Date    Anemia     Depression     Dysmenorrhea 2017    Hypothyroidism     Thrombocytopenia (HCC)      Medications:  Prior to Admission medications    Medication Sig Start Date End Date Taking? Authorizing Provider   ferrous sulfate (IRON 325) 325 (65 Fe) MG tablet Take 1 tablet by mouth daily (with breakfast)    Provider, MD Deborah   levothyroxine (SYNTHROID) 25 MCG tablet Take 1 tablet by mouth daily 24   Tamar

## 2024-05-06 PROBLEM — O47.00 THREATENED PRETERM LABOR, ANTEPARTUM: Status: ACTIVE | Noted: 2024-05-06

## 2024-05-06 LAB
AMPHETAMINES UR QL SCN>1000 NG/ML: NORMAL
BARBITURATES UR QL SCN>200 NG/ML: NORMAL
BASOPHILS # BLD: 0 K/UL (ref 0–0.2)
BASOPHILS NFR BLD: 0.1 %
BENZODIAZ UR QL SCN>200 NG/ML: NORMAL
BUPRENORPHINE+NOR UR QL SCN: NORMAL
CANNABINOIDS UR QL SCN>50 NG/ML: NORMAL
COCAINE UR QL SCN: NORMAL
DEPRECATED RDW RBC AUTO: 13.3 % (ref 12.4–15.4)
DRUG SCREEN COMMENT UR-IMP: NORMAL
EOSINOPHIL # BLD: 0 K/UL (ref 0–0.6)
EOSINOPHIL NFR BLD: 0 %
FENTANYL SCREEN, URINE: NORMAL
HCT VFR BLD AUTO: 30.4 % (ref 36–48)
HGB BLD-MCNC: 10.4 G/DL (ref 12–16)
LYMPHOCYTES # BLD: 0.7 K/UL (ref 1–5.1)
LYMPHOCYTES NFR BLD: 8.2 %
MCH RBC QN AUTO: 30.9 PG (ref 26–34)
MCHC RBC AUTO-ENTMCNC: 34.3 G/DL (ref 31–36)
MCV RBC AUTO: 90.2 FL (ref 80–100)
METHADONE UR QL SCN>300 NG/ML: NORMAL
MONOCYTES # BLD: 0.1 K/UL (ref 0–1.3)
MONOCYTES NFR BLD: 1.2 %
NEUTROPHILS # BLD: 7.9 K/UL (ref 1.7–7.7)
NEUTROPHILS NFR BLD: 90.5 %
OPIATES UR QL SCN>300 NG/ML: NORMAL
OXYCODONE UR QL SCN: NORMAL
PCP UR QL SCN>25 NG/ML: NORMAL
PH UR STRIP: 6 [PH]
PLATELET # BLD AUTO: 100 K/UL (ref 135–450)
PMV BLD AUTO: 8.7 FL (ref 5–10.5)
RBC # BLD AUTO: 3.37 M/UL (ref 4–5.2)
WBC # BLD AUTO: 8.7 K/UL (ref 4–11)

## 2024-05-06 PROCEDURE — 96376 TX/PRO/DX INJ SAME DRUG ADON: CPT

## 2024-05-06 PROCEDURE — G0378 HOSPITAL OBSERVATION PER HR: HCPCS

## 2024-05-06 PROCEDURE — 36415 COLL VENOUS BLD VENIPUNCTURE: CPT

## 2024-05-06 PROCEDURE — 99232 SBSQ HOSP IP/OBS MODERATE 35: CPT | Performed by: OBSTETRICS & GYNECOLOGY

## 2024-05-06 PROCEDURE — 85025 COMPLETE CBC W/AUTO DIFF WBC: CPT

## 2024-05-06 PROCEDURE — 2580000003 HC RX 258: Performed by: OBSTETRICS & GYNECOLOGY

## 2024-05-06 PROCEDURE — 96361 HYDRATE IV INFUSION ADD-ON: CPT

## 2024-05-06 PROCEDURE — 6370000000 HC RX 637 (ALT 250 FOR IP): Performed by: OBSTETRICS & GYNECOLOGY

## 2024-05-06 PROCEDURE — 6360000002 HC RX W HCPCS: Performed by: OBSTETRICS & GYNECOLOGY

## 2024-05-06 RX ORDER — ACETAMINOPHEN 500 MG
1000 TABLET ORAL EVERY 8 HOURS PRN
Status: DISCONTINUED | OUTPATIENT
Start: 2024-05-06 | End: 2024-05-07 | Stop reason: HOSPADM

## 2024-05-06 RX ORDER — ONDANSETRON 4 MG/1
4 TABLET, ORALLY DISINTEGRATING ORAL EVERY 8 HOURS PRN
Status: DISCONTINUED | OUTPATIENT
Start: 2024-05-06 | End: 2024-05-07 | Stop reason: HOSPADM

## 2024-05-06 RX ORDER — LEVOTHYROXINE SODIUM 0.03 MG/1
25 TABLET ORAL DAILY
Status: DISCONTINUED | OUTPATIENT
Start: 2024-05-07 | End: 2024-05-07 | Stop reason: HOSPADM

## 2024-05-06 RX ORDER — ONDANSETRON 2 MG/ML
4 INJECTION INTRAMUSCULAR; INTRAVENOUS EVERY 6 HOURS PRN
Status: DISCONTINUED | OUTPATIENT
Start: 2024-05-06 | End: 2024-05-07 | Stop reason: HOSPADM

## 2024-05-06 RX ADMIN — BETAMETHASONE SODIUM PHOSPHATE AND BETAMETHASONE ACETATE 12 MG: 3; 3 INJECTION, SUSPENSION INTRA-ARTICULAR; INTRALESIONAL; INTRAMUSCULAR at 21:55

## 2024-05-06 RX ADMIN — ACETAMINOPHEN 1000 MG: 500 TABLET ORAL at 12:59

## 2024-05-06 RX ADMIN — SODIUM CHLORIDE, POTASSIUM CHLORIDE, SODIUM LACTATE AND CALCIUM CHLORIDE: 600; 310; 30; 20 INJECTION, SOLUTION INTRAVENOUS at 14:05

## 2024-05-06 ASSESSMENT — PAIN - FUNCTIONAL ASSESSMENT: PAIN_FUNCTIONAL_ASSESSMENT: ACTIVITIES ARE NOT PREVENTED

## 2024-05-06 ASSESSMENT — PAIN SCALES - GENERAL: PAINLEVEL_OUTOF10: 6

## 2024-05-06 ASSESSMENT — PAIN DESCRIPTION - DESCRIPTORS: DESCRIPTORS: ACHING

## 2024-05-06 ASSESSMENT — PAIN DESCRIPTION - LOCATION: LOCATION: HEAD
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
## 2024-05-07 VITALS
SYSTOLIC BLOOD PRESSURE: 111 MMHG | HEART RATE: 90 BPM | TEMPERATURE: 98.4 F | DIASTOLIC BLOOD PRESSURE: 69 MMHG | RESPIRATION RATE: 16 BRPM | OXYGEN SATURATION: 98 %

## 2024-05-07 LAB
BASOPHILS # BLD: 0 K/UL (ref 0–0.2)
BASOPHILS NFR BLD: 0.1 %
DEPRECATED RDW RBC AUTO: 13.6 % (ref 12.4–15.4)
EOSINOPHIL # BLD: 0 K/UL (ref 0–0.6)
EOSINOPHIL NFR BLD: 0 %
HCT VFR BLD AUTO: 31.5 % (ref 36–48)
HGB BLD-MCNC: 10.7 G/DL (ref 12–16)
LYMPHOCYTES # BLD: 0.7 K/UL (ref 1–5.1)
LYMPHOCYTES NFR BLD: 7.6 %
MCH RBC QN AUTO: 30.7 PG (ref 26–34)
MCHC RBC AUTO-ENTMCNC: 34 G/DL (ref 31–36)
MCV RBC AUTO: 90.2 FL (ref 80–100)
MONOCYTES # BLD: 0.2 K/UL (ref 0–1.3)
MONOCYTES NFR BLD: 2.9 %
NEUTROPHILS # BLD: 7.7 K/UL (ref 1.7–7.7)
NEUTROPHILS NFR BLD: 89.4 %
PLATELET # BLD AUTO: 110 K/UL (ref 135–450)
PMV BLD AUTO: 8.3 FL (ref 5–10.5)
RBC # BLD AUTO: 3.49 M/UL (ref 4–5.2)
WBC # BLD AUTO: 8.6 K/UL (ref 4–11)

## 2024-05-07 PROCEDURE — 36415 COLL VENOUS BLD VENIPUNCTURE: CPT

## 2024-05-07 PROCEDURE — 85025 COMPLETE CBC W/AUTO DIFF WBC: CPT

## 2024-05-07 PROCEDURE — 6370000000 HC RX 637 (ALT 250 FOR IP): Performed by: OBSTETRICS & GYNECOLOGY

## 2024-05-07 PROCEDURE — 99238 HOSP IP/OBS DSCHRG MGMT 30/<: CPT | Performed by: OBSTETRICS & GYNECOLOGY

## 2024-05-07 PROCEDURE — G0378 HOSPITAL OBSERVATION PER HR: HCPCS

## 2024-05-07 RX ADMIN — LEVOTHYROXINE SODIUM 25 MCG: 0.03 TABLET ORAL at 07:22

## 2024-05-07 NOTE — DISCHARGE INSTRUCTIONS
-It is important to eat regularly.  Try to eat at least three meals a day.  If you cannot eat much at one meal, try small, frequent meals.    -Drink plenty of fluids, especially water.  Try to avoid caffeine drinks (Mountain Dew, Coke, Pepsi, coffee, tea).  Do not drink alcohol.    -Rest in bed as much as possible.  Try not to rest on your back.    -No sexual intercourse unless approved by your doctor or midwife.    -Avoid nipple stimulation and nipple exercises for breast feeding.    -No strenuous exercises or lifting.    Call your doctor for:    -Pressure in lower abdomen (feels like baby pushing down)    -Increase in amount or change in vaginal discharge    -Low, dull backache which is not relieved by rest    -Cramping or contractions (feels like \"the baby is balling up\") frequently    -Decrease in baby's movements    -Any fluid leaking from the vagina or any vaginal blood     -It is important to eat regularly.  Try to eat at least three meals a day.  If you cannot eat much at one meal, try small, frequent meals.    -Drink plenty of fluids, especially water.  Try to avoid caffeine drinks (Mountain Dew, Coke, Pepsi, coffee, tea).  Do not drink alcohol.    -Rest in bed as much as possible.  Try not to rest on your back.    -No sexual intercourse unless approved by your doctor or midwife.    -Avoid nipple stimulation and nipple exercises for breast feeding.    -No strenuous exercises or lifting.    Call your doctor for:    -Pressure in lower abdomen (feels like baby pushing down)    -Increase in amount or change in vaginal discharge    -Low, dull backache which is not relieved by rest    -Cramping or contractions (feels like \"the baby is balling up\") frequently    -Decrease in baby's movements    -Any fluid leaking from the vagina or any vaginal blood

## 2024-05-07 NOTE — PROGRESS NOTES
at bedside and Group B strep culture obtained.  
 at bedside to evaluate pt. Speculum exam placed and fetal fibronectin completed. VE performed and pt noted to be closed/high. No bleeding noted. Plan of care discussed with pt to begin IV hydration, draw labwork, have Ultrasound performed, and monitor possibly overnight. Pt and significant other verbalize understanding. Orders placed.  
 called and asked to come to bedside to review ultrasound findings with ultrasound tech.  shown chux pad that was removed from under pt with 2 blood stains appearing dark red measuring approx 4cm x 6cm each.  talking with pt about keeping her for 24 hours and to begin Celestone injections and evaluate her with continuous monitoring. Pt verbalizes understanding.   
Antepartum Progress Note    Subjective:  24 y.o.  at 34w3d (Estimated Date of Delivery: 24) presents s/p fall. Patient reports some cramping this morning and that she had some blood on her pad this morning. She denies feeling contractions or cramping currently. She denies fever or chills. She denies chest pain shortness or air. She voices no new complaints at this time.       Review of Systems:  Negative except vaginal bleeding, cramping     Objective:  /75   Pulse 87   Temp 98.1 °F (36.7 °C) (Oral)   Resp 16   LMP 2023 (Exact Date)   SpO2 98%   General: Alert, well appearing, no acute distress  Lungs: Unlabored respirations   Abdomen: Gravid, soft, nontender to palpation   Extremities: No redness or tenderness    Cervix:Dilation (cm): Closed  Effacement: 50  Station: -3  OB Examiner:     Fetal Monitoring   FHT: 135 bpm  moderate lou, +accels, no decelerations, Cat 1   [place]: every  1-5  by external monitor     Assessment/Plan  24 y.o.  at 34w3d     1) s/p abdominal trauma    Will continue to monitor patient    OK to eat if no bleed noted no pad after it has been switched out    Plan to complete betamethasone course and observe.     2) Velamentous cord insertion    Will need serial growth scans       Disposition:  Possible discharge home tomorrow.       Samantha Daniels DO    
Antepartum Progress Note    Subjective:  24 y.o.  at 34w4d (Estimated Date of Delivery: 24) presented on 24 s/p fall. Admitted for obs due to contractions and rule-out abruption.     Doing well this AM, no complaints. Denies VB, LOF. +FM. Having occasional cramps but nothing painful or regular. Otherwise doing well at this time.    Review of Systems - The following ROS was otherwise negative, except as noted in the HPI: constitutional, respiratory, cardiovascular, gastrointestinal, genitourinary    Objective:  /69   Pulse 90   Temp (P) 98.4 °F (36.9 °C) (Oral)   Resp (P) 16   LMP 2023 (Exact Date)   SpO2 98%   Physical Exam  HENT:      Head: Normocephalic.   Cardiovascular:      Rate and Rhythm: Normal rate.   Pulmonary:      Effort: Pulmonary effort is normal. No respiratory distress.   Abdominal:      Palpations: Abdomen is soft.      Tenderness: There is no abdominal tenderness. There is no guarding or rebound.   Skin:     General: Skin is warm and dry.   Neurological:      General: No focal deficit present.      Mental Status: She is alert.   Psychiatric:         Mood and Affect: Mood normal.       Cervix: closed on admission    Fetal Monitoring   FHTs: 130 baseline, moderate variability, + accelerations, no decelerations  [place]: irregular    Labs  CBC:   Recent Labs     24  0547 24  0803   WBC 10.6 8.7 8.6   HGB 11.4* 10.4* 10.7*   HCT 32.2* 30.4* 31.5*   * 100* 110*     BMP:  No results for input(s): \"NA\", \"K\", \"CL\", \"CO2\", \"BUN\", \"CREATININE\", \"GLUCOSE\" in the last 72 hours.  Hepatic: No results for input(s): \"AST\", \"ALT\", \"BILITOT\", \"ALKPHOS\" in the last 72 hours.    Invalid input(s): \"ALB\"  Mag:    No results for input(s): \"MG\" in the last 72 hours.   Phos:   No results for input(s): \"PHOS\" in the last 72 hours.   INR:   Recent Labs     24   INR 1.09       Assessment/Plan  24 y.o.  at 34w4d admitted for obs after fall    1) 
Discharge instructions given.  Allowed time for questions/answers.  Verbalizes understanding of all instructions given.  Ambulated out with , undelivered, not in active labor.  
IV started and labs drawn as ordered. Lactated Ringers bolus infusing without difficulty.  
Patient seen and evaluated at bedside. Patient has questions about diagnosis of velamentous cord insertion. Discussed with patient that we typically follow patients with growth. Discussed with patient that we are able to deliver patients after 32 weeks at this facility as well as have 24.7 anesthesia and obgyn coverage. Patient interested in possible referral to [facility] as an outpatient. Discussed with primary OBGYN who is agreeable. All questions answered and patient verbalized understanding of plan.   
Pt had previously been prescribed Synthroid 25 mcg to be taken, has not yet started. Discussed with Dr. Daniels over the phone- okay to order to be started in the morning.   
Ultrasound tech at bedside to perform ultrasound for cervical length and rule out abruption.  
Upon transferring pt to room 378, a small, fist-sized spot of bright red blood noted on pt pad. Dr. Boyle notified. Verbal order to notify DO if bright red blood increases, if pt feels pressure, or anything non-reassuring appears on FHRT tracing. No additional orders at this time.   
88

## 2024-05-07 NOTE — TELEPHONE ENCOUNTER
"Recommended To-Do List      Prepared on: 05/10/2024       You can get the best results from your medications by completing the items on this \"To-Do List.\"      Bring your To-Do List when you go to your doctor. And, share it with your family or caregivers.    My To-Do List:  What we talked about: What I should do:    What my medicines are for, how to know if my medicines are working, made sure my medicines are safe for me and reviewed how to take my medicines.     Take my medicines every day                 " Pt calling in this morning to answering service with questions about medication in early pregnancy   When we tried to call her back, it went straight to voicemail at 11:56 am     ----------------------------------------------------------------------------------    Called back at 12:10 pm.   Pt states she has had brown bloody discharge has now become \"tissue\" like discharge. Does admit to some low abdominal pain and on the left side + on/off pain in back   Denies issues with urination / Denies fevers / chills / diarrhea. Does admit to some constipation, denies having proper in 1+ weeks   Reviewed recommendations for constipation -- increase fluids, BID colace, metamucil, if no BM in 24-48 hrs consider dose of Mirralax with understanding cramping could increase. Reviewed recommendations for bleeding /discharge -- present to ED if soaking pad in hr, otherwise OK to watch at home. She has an apt sched 11/6/23 with US -- I recommend she keep this to determine viability. Pt unsure if she is continuing with the pregnancy -- has an apt with PP to discuss options.      Forwarding to provider as Hannah Ruffin   [facility]

## 2024-05-07 NOTE — DISCHARGE SUMMARY
Department of Obstetrics and Gynecology  Discharge Summary      Admit Date: 2024    Admit Diagnosis: Threatened  labor, antepartum [O47.00]    Discharge Date: 24    Condition at Discharge: good    Discharge Diagnoses:  threatened  labor, undelivered    Discharge Disposition:  Home    Service: Obstetrics    Hospital Course:  Patient admitted after fall for observation for PTL and rule-out abruption. Had uncomplicated hospital stay, no signs/sx of labor  and SVE closed on admission. Received BMTZ x2 and discharged to home after 24hr obs with strict return precautions and close outpatient follow-up.      Current Discharge Medication List        CONTINUE these medications which have NOT CHANGED    Details   ferrous sulfate (IRON 325) 325 (65 Fe) MG tablet Take 1 tablet by mouth daily (with breakfast)      levothyroxine (SYNTHROID) 25 MCG tablet Take 1 tablet by mouth daily  Qty: 30 tablet, Refills: 0      Prenatal MV-Min-Fe Fum-FA-DHA (PRENATAL 1 PO) Take by mouth      famotidine (PEPCID) 20 MG tablet Take 1 tablet by mouth 2 times daily  Qty: 60 tablet, Refills: 3      fluticasone (FLONASE) 50 MCG/ACT nasal spray 2 sprays by Each Nostril route daily  Qty: 16 g, Refills: 3    Associated Diagnoses: Dysfunction of right eustachian tube             Electronically signed by Luana Hancock MD on 2024 at 10:53 AM

## 2024-05-08 LAB — GP B STREP DNA SPEC QL NAA+PROBE: NORMAL

## 2024-05-10 ENCOUNTER — TELEPHONE (OUTPATIENT)
Dept: OBGYN CLINIC | Age: 24
End: 2024-05-10

## 2024-05-10 ENCOUNTER — ROUTINE PRENATAL (OUTPATIENT)
Dept: OBGYN CLINIC | Age: 24
End: 2024-05-10

## 2024-05-10 VITALS
DIASTOLIC BLOOD PRESSURE: 60 MMHG | HEART RATE: 90 BPM | SYSTOLIC BLOOD PRESSURE: 108 MMHG | WEIGHT: 134 LBS | TEMPERATURE: 97.6 F | BODY MASS INDEX: 23.74 KG/M2

## 2024-05-10 DIAGNOSIS — D69.6 THROMBOCYTOPENIA (HCC): ICD-10-CM

## 2024-05-10 DIAGNOSIS — O43.123 VELAMENTOUS INSERTION OF UMBILICAL CORD IN THIRD TRIMESTER: ICD-10-CM

## 2024-05-10 DIAGNOSIS — O09.899 MATERNAL VARICELLA, NON-IMMUNE: ICD-10-CM

## 2024-05-10 DIAGNOSIS — Z28.39 MATERNAL VARICELLA, NON-IMMUNE: ICD-10-CM

## 2024-05-10 DIAGNOSIS — O46.92 VAGINAL BLEEDING IN PREGNANCY, SECOND TRIMESTER: ICD-10-CM

## 2024-05-10 DIAGNOSIS — Z34.03 ENCOUNTER FOR PRENATAL CARE IN THIRD TRIMESTER OF FIRST PREGNANCY: Primary | ICD-10-CM

## 2024-05-10 DIAGNOSIS — O26.893 HEARTBURN DURING PREGNANCY IN THIRD TRIMESTER: ICD-10-CM

## 2024-05-10 DIAGNOSIS — E03.9 HYPOTHYROIDISM AFFECTING PREGNANCY IN THIRD TRIMESTER: ICD-10-CM

## 2024-05-10 DIAGNOSIS — O20.8 SUBCHORIONIC HEMORRHAGE IN FIRST TRIMESTER: ICD-10-CM

## 2024-05-10 DIAGNOSIS — O09.893 CYSTIC FIBROSIS CARRIER IN THIRD TRIMESTER, ANTEPARTUM: ICD-10-CM

## 2024-05-10 DIAGNOSIS — Z14.1 CYSTIC FIBROSIS CARRIER IN THIRD TRIMESTER, ANTEPARTUM: ICD-10-CM

## 2024-05-10 DIAGNOSIS — R12 HEARTBURN DURING PREGNANCY IN THIRD TRIMESTER: ICD-10-CM

## 2024-05-10 DIAGNOSIS — O99.283 HYPOTHYROIDISM AFFECTING PREGNANCY IN THIRD TRIMESTER: ICD-10-CM

## 2024-05-10 NOTE — PROGRESS NOTES
24 y.o.  at 35w0d EGA Estimated Date of Delivery: 24 here for JANNET:     Pt seen and examined. No concerns/complaints.  Denies VB, LOF, CTX. Endorses (+) FM. Denies fevers / chills / chest pain / shortness of breath.   Denies HA, changes with vision, RUQ pain, edema.   MWQ reviewed.     Objective:  /60   Pulse 90   Temp 97.6 °F (36.4 °C) (Infrared)   Wt 60.8 kg (134 lb)   LMP 2023 (Exact Date)   BMI 23.74 kg/m²   Gen: AO, NAD  Abd: Soft, NT, gravid   Ext: Mild LE edema    Assessment/Plan:   Diagnosis Orders   1. Encounter for prenatal care in third trimester of first pregnancy        2. Subchorionic hemorrhage in first trimester        3. Vaginal bleeding in pregnancy, second trimester        4. Heartburn during pregnancy in third trimester        5. Hypothyroidism affecting pregnancy in third trimester        6. Maternal varicella, non-immune        7. Cystic fibrosis carrier in third trimester, antepartum        8. Velamentous insertion of umbilical cord in third trimester        9. Thrombocytopenia (HCC)            Diagnosis Orders   1. Encounter for prenatal care in third trimester of first pregnancy       - Fetal wellbeing reassuring by FH and FHR today     - Continue PNV     - PNL: O positive/ab negative, R Immune, Varicella non-immune, HepB non-reactive, HepC negative, HIV non-reactive, Syphilis non-reactive, TSH 3.8 (has upcoming appt with Endocrinology), Hgb 13.6, Plt 134, UCx < 10,000 mixed yovany, GCCT neg/neg, Pap NILM 2022     - Anatomy: With MFM - fetal echogenic bowel     - Aneuploidy screen: [id number] negative, female      - Carrier screen: CF carrier, FOB positive     - AFP: Declines     - Tdap 4/3/2024     - Glucose screen: 81     - CBC: Hgb 10.9, Plts 104     - Labor, decreased FM, VB, LOF precautions reviewed     - Return precautions reviewed    2. Subchorionic hemorrhage in first trimester      - Has had vaginal bleeding secondary to sub-chorionic hemorrhage     -

## 2024-05-10 NOTE — TELEPHONE ENCOUNTER
Could you please reach out to this patient and have her scheduled at your convenience?     Thank you

## 2024-05-10 NOTE — TELEPHONE ENCOUNTER
----- Message from Samantha Daniels DO sent at 5/10/2024 12:14 PM EDT -----  Can you get this patient set up with counseling. She is pregnant. Thanks.

## 2024-05-14 ENCOUNTER — ROUTINE PRENATAL (OUTPATIENT)
Dept: OBGYN CLINIC | Age: 24
End: 2024-05-14
Payer: COMMERCIAL

## 2024-05-14 VITALS
SYSTOLIC BLOOD PRESSURE: 125 MMHG | DIASTOLIC BLOOD PRESSURE: 65 MMHG | HEART RATE: 94 BPM | WEIGHT: 133.6 LBS | BODY MASS INDEX: 23.67 KG/M2 | TEMPERATURE: 97.7 F

## 2024-05-14 DIAGNOSIS — E03.9 HYPOTHYROIDISM AFFECTING PREGNANCY IN THIRD TRIMESTER: ICD-10-CM

## 2024-05-14 DIAGNOSIS — O26.893 HEARTBURN DURING PREGNANCY IN THIRD TRIMESTER: ICD-10-CM

## 2024-05-14 DIAGNOSIS — O99.283 HYPOTHYROIDISM AFFECTING PREGNANCY IN THIRD TRIMESTER: ICD-10-CM

## 2024-05-14 DIAGNOSIS — O09.893 CYSTIC FIBROSIS CARRIER IN THIRD TRIMESTER, ANTEPARTUM: ICD-10-CM

## 2024-05-14 DIAGNOSIS — D69.6 THROMBOCYTOPENIA (HCC): ICD-10-CM

## 2024-05-14 DIAGNOSIS — O43.123 VELAMENTOUS INSERTION OF UMBILICAL CORD IN THIRD TRIMESTER: ICD-10-CM

## 2024-05-14 DIAGNOSIS — R12 HEARTBURN DURING PREGNANCY IN THIRD TRIMESTER: ICD-10-CM

## 2024-05-14 DIAGNOSIS — O46.92 VAGINAL BLEEDING IN PREGNANCY, SECOND TRIMESTER: ICD-10-CM

## 2024-05-14 DIAGNOSIS — Z34.03 ENCOUNTER FOR PRENATAL CARE IN THIRD TRIMESTER OF FIRST PREGNANCY: Primary | ICD-10-CM

## 2024-05-14 DIAGNOSIS — Z14.1 CYSTIC FIBROSIS CARRIER IN THIRD TRIMESTER, ANTEPARTUM: ICD-10-CM

## 2024-05-14 DIAGNOSIS — O20.8 SUBCHORIONIC HEMORRHAGE IN FIRST TRIMESTER: ICD-10-CM

## 2024-05-14 PROCEDURE — 1036F TOBACCO NON-USER: CPT | Performed by: OBSTETRICS & GYNECOLOGY

## 2024-05-14 PROCEDURE — 99213 OFFICE O/P EST LOW 20 MIN: CPT | Performed by: OBSTETRICS & GYNECOLOGY

## 2024-05-14 PROCEDURE — G8427 DOCREV CUR MEDS BY ELIG CLIN: HCPCS | Performed by: OBSTETRICS & GYNECOLOGY

## 2024-05-14 PROCEDURE — G8420 CALC BMI NORM PARAMETERS: HCPCS | Performed by: OBSTETRICS & GYNECOLOGY

## 2024-05-14 RX ORDER — SERTRALINE HYDROCHLORIDE 25 MG/1
25 TABLET, FILM COATED ORAL DAILY
COMMUNITY

## 2024-05-14 NOTE — PROGRESS NOTES
Return OB Office Visit    CC:   Chief Complaint   Patient presents with    Routine Prenatal Visit       HPI:  Pt seen and examined. No concerns/complaints. Denies VB, LOF, ctx. +FM.    Maternal wellness questionnaire reviewed - no concerns today.     Objective:  /65   Pulse 94   Temp 97.7 °F (36.5 °C) (Infrared)   Wt 60.6 kg (133 lb 9.6 oz)   LMP 2023 (Exact Date)   BMI 23.67 kg/m²   Gen: AO, NAD  Abd: Soft, NT  FHT: 140  FH: 33cm, vertex by Marcialds    Assessment/Plan:  24 y.o.  at 35w4d (Estimated Date of Delivery: 24) presents for JANNET appointment:      Diagnosis Orders   1. Encounter for prenatal care in third trimester of first pregnancy        2. Subchorionic hemorrhage in first trimester        3. Vaginal bleeding in pregnancy, second trimester        4. Heartburn during pregnancy in third trimester        5. Hypothyroidism affecting pregnancy in third trimester        6. Cystic fibrosis carrier in third trimester, antepartum        7. Velamentous insertion of umbilical cord in third trimester        8. Thrombocytopenia (HCC)          Doing well today, routine care  - FWB reassuring on doptones today  - GBS neg at 34 weeks, repeat around 39 weeks if undelivered  - continue synthroid  - MFM appointmetn 24 for velamentous cord  - sees heme for thrombocytopenia    Dispo: RTC in 1 week  Luana Hancock MD

## 2024-05-24 ENCOUNTER — ROUTINE PRENATAL (OUTPATIENT)
Dept: OBGYN CLINIC | Age: 24
End: 2024-05-24
Payer: COMMERCIAL

## 2024-05-24 VITALS
BODY MASS INDEX: 24.09 KG/M2 | WEIGHT: 136 LBS | TEMPERATURE: 97.5 F | HEART RATE: 94 BPM | SYSTOLIC BLOOD PRESSURE: 102 MMHG | DIASTOLIC BLOOD PRESSURE: 62 MMHG

## 2024-05-24 DIAGNOSIS — O09.893 CYSTIC FIBROSIS CARRIER IN THIRD TRIMESTER, ANTEPARTUM: ICD-10-CM

## 2024-05-24 DIAGNOSIS — F39 MOOD DISORDER (HCC): ICD-10-CM

## 2024-05-24 DIAGNOSIS — O20.8 SUBCHORIONIC HEMORRHAGE IN FIRST TRIMESTER: ICD-10-CM

## 2024-05-24 DIAGNOSIS — O09.899 MATERNAL VARICELLA, NON-IMMUNE: ICD-10-CM

## 2024-05-24 DIAGNOSIS — E03.9 HYPOTHYROIDISM AFFECTING PREGNANCY IN THIRD TRIMESTER: ICD-10-CM

## 2024-05-24 DIAGNOSIS — O26.893 HEARTBURN DURING PREGNANCY IN THIRD TRIMESTER: ICD-10-CM

## 2024-05-24 DIAGNOSIS — O21.9 NAUSEA AND VOMITING IN PREGNANCY: ICD-10-CM

## 2024-05-24 DIAGNOSIS — Z14.1 CYSTIC FIBROSIS CARRIER IN THIRD TRIMESTER, ANTEPARTUM: ICD-10-CM

## 2024-05-24 DIAGNOSIS — D69.6 THROMBOCYTOPENIA (HCC): ICD-10-CM

## 2024-05-24 DIAGNOSIS — O46.90 VAGINAL BLEEDING IN PREGNANCY: ICD-10-CM

## 2024-05-24 DIAGNOSIS — R12 HEARTBURN DURING PREGNANCY IN THIRD TRIMESTER: ICD-10-CM

## 2024-05-24 DIAGNOSIS — O43.123 VELAMENTOUS INSERTION OF UMBILICAL CORD IN THIRD TRIMESTER: ICD-10-CM

## 2024-05-24 DIAGNOSIS — Z28.39 MATERNAL VARICELLA, NON-IMMUNE: ICD-10-CM

## 2024-05-24 DIAGNOSIS — O99.283 HYPOTHYROIDISM AFFECTING PREGNANCY IN THIRD TRIMESTER: ICD-10-CM

## 2024-05-24 DIAGNOSIS — Z34.03 ENCOUNTER FOR PRENATAL CARE IN THIRD TRIMESTER OF FIRST PREGNANCY: Primary | ICD-10-CM

## 2024-05-24 PROCEDURE — 1036F TOBACCO NON-USER: CPT | Performed by: OBSTETRICS & GYNECOLOGY

## 2024-05-24 PROCEDURE — 99213 OFFICE O/P EST LOW 20 MIN: CPT | Performed by: OBSTETRICS & GYNECOLOGY

## 2024-05-24 PROCEDURE — G8427 DOCREV CUR MEDS BY ELIG CLIN: HCPCS | Performed by: OBSTETRICS & GYNECOLOGY

## 2024-05-24 PROCEDURE — G8420 CALC BMI NORM PARAMETERS: HCPCS | Performed by: OBSTETRICS & GYNECOLOGY

## 2024-05-27 ENCOUNTER — TELEPHONE (OUTPATIENT)
Dept: OBGYN CLINIC | Age: 24
End: 2024-05-27

## 2024-05-27 PROBLEM — O43.123 VELAMENTOUS INSERTION OF UMBILICAL CORD IN THIRD TRIMESTER: Status: ACTIVE | Noted: 2024-05-27

## 2024-05-27 NOTE — TELEPHONE ENCOUNTER
23 y/o  female at 37 weeks 3 days gestation with EDC 24  Telephone call:  Concerned with postcoital bleeding.  Noted initial bleeding with intercourse.  Monitored bleeding for the next hour and passed a formed clot 1 hour later.  Denies bright red bleeding.   Denies contractions, pelvic pain and loss of fluid.  Admits to fetal movement.   Denies other symptoms or complaints.   Has experienced postcoital bleeding in the past.  Pregnancy is complicated by CF carrier status, marginal cord insertion and bilobed placenta with exposed crossing vessels.   Reviewed placental issues with patient and low threshold for evaluation with any bleeding.   Notified L&D of patient.   Patient would like to continue to monitor at home.   Patient advised to report to triage if any further bleeding, pelvic pain or decreased fetal movement.   Copying bakari and Dr. Boyle as FYI.   Thanks

## 2024-05-27 NOTE — PROGRESS NOTES
Return OB Office Visit    CC:   Chief Complaint   Patient presents with    Routine Prenatal Visit       HPI:  Patient seen and examined. Doing well today without complaints    Denies LOF, VB ctx. +FM.  Denies headaches, vision changes, RUQ pain, increased LE edema.  Denies chest pain, shortness of breath, fever, chills, nausea, vomiting.  Continues to have some acid reflux.    Has seen her Psychiatrist who recommended start of Zoloft.  Has not started yet.    Has had anatomy scan with MFM.  Was found to have fetal echogenic bowel.  Patient is a carrier for CF and her partner has tested positive as a carrier as well.  Amniocentesis showed baby is a carrier for CF.     Was seen recently in triage with velamentous cord insertion noted.  Follow-up with MFM showed a fundal bilobed placenta with marginal and velamentous cord insertion.  Recommended growth scan which is stable as well as monitoring for retained placenta following delivery.     Was seen by Dr. Harrison with [facility] and her platelets are now 70,000.  Has a follow-up next Friday.  Is concerned about hemorrhage risk.    Review of Systems: The following ROS was otherwise negative, except as noted in the HPI: constitutional, HEENT, respiratory, cardiovascular, gastrointestinal, genitourinary, skin, musculoskeletal, neurological, psych    Objective:  /62   Pulse 94   Temp 97.5 °F (36.4 °C) (Infrared)   Wt 61.7 kg (136 lb)   LMP 09/08/2023 (Exact Date)   BMI 24.09 kg/m²     Physical Exam  Vitals reviewed.   Constitutional:       General: She is not in acute distress.     Appearance: She is well-developed.   HENT:      Head: Normocephalic and atraumatic.   Eyes:      Conjunctiva/sclera: Conjunctivae normal.   Cardiovascular:      Rate and Rhythm: Normal rate.   Pulmonary:      Effort: Pulmonary effort is normal. No respiratory distress.   Abdominal:      General: There is no distension.      Palpations: Abdomen is soft.      Tenderness: There is no abdominal

## 2024-05-29 ENCOUNTER — ROUTINE PRENATAL (OUTPATIENT)
Dept: OBGYN CLINIC | Age: 24
End: 2024-05-29
Payer: COMMERCIAL

## 2024-05-29 VITALS
HEART RATE: 68 BPM | TEMPERATURE: 97.9 F | BODY MASS INDEX: 24.23 KG/M2 | WEIGHT: 136.8 LBS | SYSTOLIC BLOOD PRESSURE: 112 MMHG | DIASTOLIC BLOOD PRESSURE: 68 MMHG

## 2024-05-29 DIAGNOSIS — F39 MOOD DISORDER (HCC): ICD-10-CM

## 2024-05-29 DIAGNOSIS — O26.893 HEARTBURN DURING PREGNANCY IN THIRD TRIMESTER: ICD-10-CM

## 2024-05-29 DIAGNOSIS — O99.283 HYPOTHYROIDISM AFFECTING PREGNANCY IN THIRD TRIMESTER: ICD-10-CM

## 2024-05-29 DIAGNOSIS — Z14.1 CYSTIC FIBROSIS CARRIER IN THIRD TRIMESTER, ANTEPARTUM: ICD-10-CM

## 2024-05-29 DIAGNOSIS — D69.6 THROMBOCYTOPENIA (HCC): ICD-10-CM

## 2024-05-29 DIAGNOSIS — O09.893 CYSTIC FIBROSIS CARRIER IN THIRD TRIMESTER, ANTEPARTUM: ICD-10-CM

## 2024-05-29 DIAGNOSIS — Z28.39 MATERNAL VARICELLA, NON-IMMUNE: ICD-10-CM

## 2024-05-29 DIAGNOSIS — O09.899 MATERNAL VARICELLA, NON-IMMUNE: ICD-10-CM

## 2024-05-29 DIAGNOSIS — O21.9 NAUSEA AND VOMITING IN PREGNANCY: ICD-10-CM

## 2024-05-29 DIAGNOSIS — O43.123 VELAMENTOUS INSERTION OF UMBILICAL CORD IN THIRD TRIMESTER: ICD-10-CM

## 2024-05-29 DIAGNOSIS — Z34.03 ENCOUNTER FOR PRENATAL CARE IN THIRD TRIMESTER OF FIRST PREGNANCY: Primary | ICD-10-CM

## 2024-05-29 DIAGNOSIS — O20.8 SUBCHORIONIC HEMORRHAGE IN FIRST TRIMESTER: ICD-10-CM

## 2024-05-29 DIAGNOSIS — E03.9 HYPOTHYROIDISM AFFECTING PREGNANCY IN THIRD TRIMESTER: ICD-10-CM

## 2024-05-29 DIAGNOSIS — O46.90 VAGINAL BLEEDING IN PREGNANCY: ICD-10-CM

## 2024-05-29 DIAGNOSIS — R12 HEARTBURN DURING PREGNANCY IN THIRD TRIMESTER: ICD-10-CM

## 2024-05-29 PROCEDURE — G8427 DOCREV CUR MEDS BY ELIG CLIN: HCPCS | Performed by: OBSTETRICS & GYNECOLOGY

## 2024-05-29 PROCEDURE — 99213 OFFICE O/P EST LOW 20 MIN: CPT | Performed by: OBSTETRICS & GYNECOLOGY

## 2024-05-29 PROCEDURE — 1036F TOBACCO NON-USER: CPT | Performed by: OBSTETRICS & GYNECOLOGY

## 2024-05-29 PROCEDURE — G8420 CALC BMI NORM PARAMETERS: HCPCS | Performed by: OBSTETRICS & GYNECOLOGY

## 2024-05-29 NOTE — PROGRESS NOTES
placenta in first trimester, single or unspecified fetus     - Has had vaginal bleeding secondary to sub-chorionic hemorrhage     - Subchorionic bleed seen measuring 1.51 x 1.99 x 1.87 cm at last visit - resolving on repeat imaging     - Bleeding had resolved until recently with intercourse     - Reviewed pelvic rest with patient     - Findings and sequelae reviewed     - Return precautions reviewed      - Will continue to follow      3. Vaginal bleeding in pregnancy     - See above      4. Mood disorder (HCC)     - Maternal wellness questionnaire reviewed - score 10     - Doing well off SSRI (Vibryd) - discontinued use     - Has spoken with Psychiatry who recommended start of Zoloft - has started and is doing well at 50mg a day     - Will continue with Yris     - Will continue to follow      5. Heartburn in pregnancy     - Doing well with Pepcid      - Conservative measures reviewed      - Return precautions reviewed     6. Nausea and vomiting in pregnancy     - No complaints today      7. Thrombocytopenia (HCC)     - Plts 126 stable throughout pregnancy     - Recent visit with Hematology with decrease to 70,000 - has follow-up with their office in 1 week     - On 28 week labs Plts 104      - Denies bleeding episodes      - Has been referred to hematology and is following closely with them for abnormal labwork     - Carrier screening negative for hemoglobinopathy     - Continue to follow with hematology     - Discussed at her next visit with their office to ask about Steroids for platelet function prior to delivery     8 Hypothyroidism affecting pregnancy     - TSH elevated at 3.8     - Has appt with Endocrinology - however did not start on medications     - Repeat 2.91 --> Synthroid started     9. Maternal varicella, non-immune     - Recommend to avoid sick contacts     - Recommend vaccination postpartum with PCP     10. Cystic fibrosis carrier     - FOB has tested positive as well for CF     - Fetal findings of

## 2024-06-05 ENCOUNTER — ROUTINE PRENATAL (OUTPATIENT)
Dept: OBGYN CLINIC | Age: 24
End: 2024-06-05
Payer: COMMERCIAL

## 2024-06-05 VITALS
BODY MASS INDEX: 24.76 KG/M2 | HEART RATE: 100 BPM | SYSTOLIC BLOOD PRESSURE: 102 MMHG | DIASTOLIC BLOOD PRESSURE: 62 MMHG | WEIGHT: 139.8 LBS

## 2024-06-05 DIAGNOSIS — O20.8 SUBCHORIONIC HEMORRHAGE IN FIRST TRIMESTER: ICD-10-CM

## 2024-06-05 DIAGNOSIS — R12 HEARTBURN DURING PREGNANCY IN THIRD TRIMESTER: ICD-10-CM

## 2024-06-05 DIAGNOSIS — O09.893 CYSTIC FIBROSIS CARRIER IN THIRD TRIMESTER, ANTEPARTUM: ICD-10-CM

## 2024-06-05 DIAGNOSIS — D69.6 THROMBOCYTOPENIA (HCC): ICD-10-CM

## 2024-06-05 DIAGNOSIS — F39 MOOD DISORDER (HCC): ICD-10-CM

## 2024-06-05 DIAGNOSIS — O46.92 VAGINAL BLEEDING IN PREGNANCY, SECOND TRIMESTER: ICD-10-CM

## 2024-06-05 DIAGNOSIS — Z34.03 ENCOUNTER FOR PRENATAL CARE IN THIRD TRIMESTER OF FIRST PREGNANCY: Primary | ICD-10-CM

## 2024-06-05 DIAGNOSIS — O09.899 MATERNAL VARICELLA, NON-IMMUNE: ICD-10-CM

## 2024-06-05 DIAGNOSIS — O26.893 HEARTBURN DURING PREGNANCY IN THIRD TRIMESTER: ICD-10-CM

## 2024-06-05 DIAGNOSIS — Z28.39 MATERNAL VARICELLA, NON-IMMUNE: ICD-10-CM

## 2024-06-05 DIAGNOSIS — O99.283 HYPOTHYROIDISM AFFECTING PREGNANCY IN THIRD TRIMESTER: ICD-10-CM

## 2024-06-05 DIAGNOSIS — E03.9 HYPOTHYROIDISM AFFECTING PREGNANCY IN THIRD TRIMESTER: ICD-10-CM

## 2024-06-05 DIAGNOSIS — O43.123 VELAMENTOUS INSERTION OF UMBILICAL CORD IN THIRD TRIMESTER: ICD-10-CM

## 2024-06-05 DIAGNOSIS — O21.9 NAUSEA AND VOMITING IN PREGNANCY: ICD-10-CM

## 2024-06-05 DIAGNOSIS — Z14.1 CYSTIC FIBROSIS CARRIER IN THIRD TRIMESTER, ANTEPARTUM: ICD-10-CM

## 2024-06-05 PROCEDURE — 36415 COLL VENOUS BLD VENIPUNCTURE: CPT | Performed by: OBSTETRICS & GYNECOLOGY

## 2024-06-05 PROCEDURE — 99213 OFFICE O/P EST LOW 20 MIN: CPT | Performed by: OBSTETRICS & GYNECOLOGY

## 2024-06-05 PROCEDURE — G8427 DOCREV CUR MEDS BY ELIG CLIN: HCPCS | Performed by: OBSTETRICS & GYNECOLOGY

## 2024-06-05 PROCEDURE — G8420 CALC BMI NORM PARAMETERS: HCPCS | Performed by: OBSTETRICS & GYNECOLOGY

## 2024-06-05 PROCEDURE — 1036F TOBACCO NON-USER: CPT | Performed by: OBSTETRICS & GYNECOLOGY

## 2024-06-05 NOTE — PROGRESS NOTES
Return OB Office Visit    CC:   Chief Complaint   Patient presents with    Routine Prenatal Visit       HPI:  Patient seen and examined. Doing well today without complaints.     Denies VB, LOF, ctx. +FM.  Denies headaches, vision changes, RUQ pain, increased LE edema.  Denies chest pain, shortness of breath, fever, chills, nausea, vomiting.      Denies bleeding episodes.  Reports that Hematology advised that they would not recommend steroids for platelet enhancement unless she was under 20,000.     Review of Systems: The following ROS was otherwise negative, except as noted in the HPI: constitutional, HEENT, respiratory, cardiovascular, gastrointestinal, genitourinary, skin, musculoskeletal, neurological, psych    Objective:  /62   Pulse 100   Wt 63.4 kg (139 lb 12.8 oz)   LMP 2023 (Exact Date)   BMI 24.76 kg/m²     Physical Exam  Vitals reviewed.   Constitutional:       General: She is not in acute distress.     Appearance: She is well-developed.   HENT:      Head: Normocephalic and atraumatic.   Eyes:      Conjunctiva/sclera: Conjunctivae normal.   Cardiovascular:      Rate and Rhythm: Normal rate.   Pulmonary:      Effort: Pulmonary effort is normal. No respiratory distress.   Abdominal:      General: There is no distension.      Palpations: Abdomen is soft.      Tenderness: There is no abdominal tenderness. There is no guarding or rebound.   Musculoskeletal:         General: No swelling.   Skin:     General: Skin is warm and dry.   Neurological:      Mental Status: She is alert and oriented to person, place, and time.   Psychiatric:         Mood and Affect: Mood normal.         Behavior: Behavior normal.         Thought Content: Thought content normal.         FHR: 143 bpm via doppler    Assessment/Plan:   Denise Moore is a 24 y.o.  at 38w5d who presents for routine OB visit      1. Encounter for prenatal care in third trimester of first pregnancy     - Doing well today without

## 2024-06-06 ENCOUNTER — TELEPHONE (OUTPATIENT)
Dept: OBGYN CLINIC | Age: 24
End: 2024-06-06

## 2024-06-06 LAB
BASOPHILS # BLD: 0 K/UL (ref 0–0.2)
BASOPHILS NFR BLD: 0.4 %
DEPRECATED RDW RBC AUTO: 17.2 % (ref 12.4–15.4)
EOSINOPHIL # BLD: 0 K/UL (ref 0–0.6)
EOSINOPHIL NFR BLD: 0.5 %
HCT VFR BLD AUTO: 37.8 % (ref 36–48)
HGB BLD-MCNC: 13.3 G/DL (ref 12–16)
LYMPHOCYTES # BLD: 1.1 K/UL (ref 1–5.1)
LYMPHOCYTES NFR BLD: 12.3 %
MCH RBC QN AUTO: 32 PG (ref 26–34)
MCHC RBC AUTO-ENTMCNC: 35 G/DL (ref 31–36)
MCV RBC AUTO: 91.4 FL (ref 80–100)
MONOCYTES # BLD: 0.5 K/UL (ref 0–1.3)
MONOCYTES NFR BLD: 5.7 %
NEUTROPHILS # BLD: 7.4 K/UL (ref 1.7–7.7)
NEUTROPHILS NFR BLD: 81.1 %
PLATELET # BLD AUTO: 96 K/UL (ref 135–450)
PLATELET BLD QL SMEAR: ABNORMAL
PMV BLD AUTO: 9.4 FL (ref 5–10.5)
RBC # BLD AUTO: 4.14 M/UL (ref 4–5.2)
SLIDE REVIEW: ABNORMAL
WBC # BLD AUTO: 9.1 K/UL (ref 4–11)

## 2024-06-06 NOTE — TELEPHONE ENCOUNTER
Patient called office today with concerns of her Maternity Leave.     Patient states that she had submitted her [employer] Forms for her Leave and they were resubmitted with her EED of 6/14/24.     Patient states that she was then scheduled for an Induction on 6/7/24 and she told her employer that she could not work on 6/9/24 and 6/10/24 but now got her induction pushed back till 6/12/24. Patient is inquiring If we could provide a letter to her employer stating that she is unable to work due to the complications she has had through her pregnancies.     Routing to MD to review and for advice.

## 2024-06-07 RX ORDER — LEVOTHYROXINE SODIUM 0.03 MG/1
25 TABLET ORAL DAILY
Qty: 30 TABLET | Refills: 0 | Status: SHIPPED | OUTPATIENT
Start: 2024-06-07

## 2024-06-07 NOTE — TELEPHONE ENCOUNTER
Okay for letter to advise she is able to be off work until induction date, however we cannot indicate that it is due to complications because you don't have any indications for time off.  However, at this point if you were to not work they may deduct this from your postpartum bonding time with the baby.  Thank you.

## 2024-06-12 ENCOUNTER — APPOINTMENT (OUTPATIENT)
Dept: LABOR AND DELIVERY | Age: 24
DRG: 560 | End: 2024-06-12
Payer: COMMERCIAL

## 2024-06-12 ENCOUNTER — HOSPITAL ENCOUNTER (INPATIENT)
Age: 24
LOS: 3 days | Discharge: HOME | DRG: 560 | End: 2024-06-15
Attending: OBSTETRICS & GYNECOLOGY | Admitting: OBSTETRICS & GYNECOLOGY
Payer: COMMERCIAL

## 2024-06-12 ENCOUNTER — TELEPHONE (OUTPATIENT)
Dept: OBGYN CLINIC | Age: 24
End: 2024-06-12

## 2024-06-12 PROBLEM — Z37.9 NORMAL LABOR: Status: ACTIVE | Noted: 2024-06-12

## 2024-06-12 LAB
ABO + RH BLD: NORMAL
AMPHETAMINES UR QL SCN>1000 NG/ML: NORMAL
BARBITURATES UR QL SCN>200 NG/ML: NORMAL
BASOPHILS # BLD: 0 K/UL (ref 0–0.2)
BASOPHILS NFR BLD: 0.1 %
BENZODIAZ UR QL SCN>200 NG/ML: NORMAL
BLD GP AB SCN SERPL QL: NORMAL
BUPRENORPHINE+NOR UR QL SCN: NORMAL
CANNABINOIDS UR QL SCN>50 NG/ML: NORMAL
COCAINE UR QL SCN: NORMAL
DEPRECATED RDW RBC AUTO: 16.9 % (ref 12.4–15.4)
DRUG SCREEN COMMENT UR-IMP: NORMAL
EOSINOPHIL # BLD: 0 K/UL (ref 0–0.6)
EOSINOPHIL NFR BLD: 0 %
FENTANYL SCREEN, URINE: NORMAL
HCT VFR BLD AUTO: 40.4 % (ref 36–48)
HGB BLD-MCNC: 13.5 G/DL (ref 12–16)
LYMPHOCYTES # BLD: 0.6 K/UL (ref 1–5.1)
LYMPHOCYTES NFR BLD: 5.5 %
MCH RBC QN AUTO: 30.6 PG (ref 26–34)
MCHC RBC AUTO-ENTMCNC: 33.3 G/DL (ref 31–36)
MCV RBC AUTO: 91.9 FL (ref 80–100)
METHADONE UR QL SCN>300 NG/ML: NORMAL
MONOCYTES # BLD: 0.1 K/UL (ref 0–1.3)
MONOCYTES NFR BLD: 1.2 %
NEUTROPHILS # BLD: 10.4 K/UL (ref 1.7–7.7)
NEUTROPHILS NFR BLD: 93.2 %
OPIATES UR QL SCN>300 NG/ML: NORMAL
OXYCODONE UR QL SCN: NORMAL
PCP UR QL SCN>25 NG/ML: NORMAL
PH UR STRIP: 6 [PH]
PLATELET # BLD AUTO: 109 K/UL (ref 135–450)
PMV BLD AUTO: 9.2 FL (ref 5–10.5)
RBC # BLD AUTO: 4.4 M/UL (ref 4–5.2)
WBC # BLD AUTO: 11.2 K/UL (ref 4–11)

## 2024-06-12 PROCEDURE — 99024 POSTOP FOLLOW-UP VISIT: CPT | Performed by: OBSTETRICS & GYNECOLOGY

## 2024-06-12 PROCEDURE — 86850 RBC ANTIBODY SCREEN: CPT

## 2024-06-12 PROCEDURE — 86900 BLOOD TYPING SEROLOGIC ABO: CPT

## 2024-06-12 PROCEDURE — 6360000002 HC RX W HCPCS: Performed by: OBSTETRICS & GYNECOLOGY

## 2024-06-12 PROCEDURE — 1220000000 HC SEMI PRIVATE OB R&B

## 2024-06-12 PROCEDURE — 85025 COMPLETE CBC W/AUTO DIFF WBC: CPT

## 2024-06-12 PROCEDURE — 80307 DRUG TEST PRSMV CHEM ANLYZR: CPT

## 2024-06-12 PROCEDURE — 86923 COMPATIBILITY TEST ELECTRIC: CPT

## 2024-06-12 PROCEDURE — 86780 TREPONEMA PALLIDUM: CPT

## 2024-06-12 PROCEDURE — 86901 BLOOD TYPING SEROLOGIC RH(D): CPT

## 2024-06-12 RX ORDER — NALBUPHINE HYDROCHLORIDE 20 MG/ML
10 INJECTION, SOLUTION INTRAMUSCULAR; INTRAVENOUS; SUBCUTANEOUS
Status: DISCONTINUED | OUTPATIENT
Start: 2024-06-12 | End: 2024-06-13

## 2024-06-12 RX ORDER — DOCUSATE SODIUM 100 MG/1
100 CAPSULE, LIQUID FILLED ORAL 2 TIMES DAILY
Status: DISCONTINUED | OUTPATIENT
Start: 2024-06-12 | End: 2024-06-13

## 2024-06-12 RX ORDER — SIMETHICONE 80 MG
80 TABLET,CHEWABLE ORAL EVERY 6 HOURS PRN
Status: DISCONTINUED | OUTPATIENT
Start: 2024-06-12 | End: 2024-06-13

## 2024-06-12 RX ORDER — TERBUTALINE SULFATE 1 MG/ML
0.25 INJECTION, SOLUTION SUBCUTANEOUS
Status: DISCONTINUED | OUTPATIENT
Start: 2024-06-12 | End: 2024-06-13

## 2024-06-12 RX ORDER — SODIUM CHLORIDE 9 MG/ML
25 INJECTION, SOLUTION INTRAVENOUS PRN
Status: DISCONTINUED | OUTPATIENT
Start: 2024-06-12 | End: 2024-06-13

## 2024-06-12 RX ORDER — ACETAMINOPHEN 325 MG/1
650 TABLET ORAL EVERY 4 HOURS PRN
Status: DISCONTINUED | OUTPATIENT
Start: 2024-06-12 | End: 2024-06-13

## 2024-06-12 RX ORDER — SODIUM CHLORIDE 0.9 % (FLUSH) 0.9 %
5-40 SYRINGE (ML) INJECTION PRN
Status: DISCONTINUED | OUTPATIENT
Start: 2024-06-12 | End: 2024-06-13

## 2024-06-12 RX ORDER — SODIUM CHLORIDE 0.9 % (FLUSH) 0.9 %
5-40 SYRINGE (ML) INJECTION EVERY 12 HOURS SCHEDULED
Status: DISCONTINUED | OUTPATIENT
Start: 2024-06-12 | End: 2024-06-13

## 2024-06-12 RX ADMIN — Medication 1 MILLI-UNITS/MIN: at 21:30

## 2024-06-12 NOTE — PROGRESS NOTES
Pt arrived to triage with complaints of vaginal bleeding since this am. Brown colored blood noted on pad, pt states she has been having bright red blood clots when wiping after urinating. Pt states + FM, denies ROM.

## 2024-06-12 NOTE — TELEPHONE ENCOUNTER
Pt states she is scheduled for induction at 7 pm tonight. 39w5d. Pt reports she has been spotting all day. Reports she has had leakage of fluid for the past 3-7 days. + FM.  I Spoke with Dr Boyle and pt was advised to report to triage. Called L&D and spoke with radha regarding send over.

## 2024-06-12 NOTE — H&P
Obstetrics Admission History and Physical    CC: vaginal bleeding    HPI: Denise Moore is a 24 y.o.  at 39w5d who presents for vaginal bleeding. Reports has had some leaking and discharge for a few days, then this morning has noted some light vaginal bleeding. +FM, no big gush of fluid. Having contractions but reports they are not too painful.    Pregnancy has been complicated by thrombocytopenia, hypothyroidism, CF carrier status, velamentous cord insertion, and mood disorder.     Review of Systems: The following ROS was otherwise negative, except as noted in the HPI: constitutional, HEENT, respiratory, cardiovascular, gastrointestinal, genitourinary, skin, musculoskeletal, neurological, psych.     OBGYN Provider : Tamar    Obstetrical History:  OB History    Para Term  AB Living   1 0 0 0 0 0   SAB IAB Ectopic Molar Multiple Live Births   0 0 0 0 0 0      # Outcome Date GA Lbr Rogers/2nd Weight Sex Delivery Anes PTL Lv   1 Current                Gynecologic History:  Denies hx of abnl pap smears.   Denies hx of STIs.    Past Medical History:   Past Medical History:   Diagnosis Date    Anemia     Anxiety     Depression     Dysmenorrhea 2017    Hypothyroidism     Thrombocytopenia (HCC)        Medications:  No current facility-administered medications on file prior to encounter.     Current Outpatient Medications on File Prior to Encounter   Medication Sig Dispense Refill    dexAMETHasone 20 MG TABS Take 20 mg by mouth daily (with breakfast) for 4 days 20 tablet 0    levothyroxine (SYNTHROID) 25 MCG tablet TAKE 1 TABLET BY MOUTH DAILY 30 tablet 0    sertraline (ZOLOFT) 25 MG tablet Take 1 tablet by mouth daily      Prenatal MV-Min-Fe Fum-FA-DHA (PRENATAL 1 PO) Take by mouth      famotidine (PEPCID) 20 MG tablet Take 1 tablet by mouth 2 times daily 60 tablet 3    fluticasone (FLONASE) 50 MCG/ACT nasal spray 2 sprays by Each Nostril route daily 16 g 3       Allergies:  Cocoa butter, Amoxicillin,

## 2024-06-13 ENCOUNTER — ANESTHESIA EVENT (OUTPATIENT)
Dept: LABOR AND DELIVERY | Age: 24
End: 2024-06-13
Payer: COMMERCIAL

## 2024-06-13 ENCOUNTER — ANESTHESIA (OUTPATIENT)
Dept: LABOR AND DELIVERY | Age: 24
End: 2024-06-13
Payer: COMMERCIAL

## 2024-06-13 LAB
BASOPHILS # BLD: 0 K/UL (ref 0–0.2)
BASOPHILS NFR BLD: 0.1 %
DEPRECATED RDW RBC AUTO: 17.4 % (ref 12.4–15.4)
EOSINOPHIL # BLD: 0 K/UL (ref 0–0.6)
EOSINOPHIL NFR BLD: 0 %
HCT VFR BLD AUTO: 32.8 % (ref 36–48)
HGB BLD-MCNC: 11 G/DL (ref 12–16)
LYMPHOCYTES # BLD: 1.4 K/UL (ref 1–5.1)
LYMPHOCYTES NFR BLD: 10 %
MCH RBC QN AUTO: 30.8 PG (ref 26–34)
MCHC RBC AUTO-ENTMCNC: 33.4 G/DL (ref 31–36)
MCV RBC AUTO: 92.3 FL (ref 80–100)
MONOCYTES # BLD: 0.9 K/UL (ref 0–1.3)
MONOCYTES NFR BLD: 6.6 %
NEUTROPHILS # BLD: 11.5 K/UL (ref 1.7–7.7)
NEUTROPHILS NFR BLD: 83.3 %
PLATELET # BLD AUTO: 78 K/UL (ref 135–450)
PLATELET BLD QL SMEAR: ABNORMAL
PMV BLD AUTO: 8.9 FL (ref 5–10.5)
RBC # BLD AUTO: 3.56 M/UL (ref 4–5.2)
REAGIN+T PALLIDUM IGG+IGM SERPL-IMP: NORMAL
SLIDE REVIEW: ABNORMAL
WBC # BLD AUTO: 13.8 K/UL (ref 4–11)

## 2024-06-13 PROCEDURE — 7200000001 HC VAGINAL DELIVERY

## 2024-06-13 PROCEDURE — 0UQMXZZ REPAIR VULVA, EXTERNAL APPROACH: ICD-10-PCS | Performed by: OBSTETRICS & GYNECOLOGY

## 2024-06-13 PROCEDURE — 59409 OBSTETRICAL CARE: CPT | Performed by: OBSTETRICS & GYNECOLOGY

## 2024-06-13 PROCEDURE — 6370000000 HC RX 637 (ALT 250 FOR IP): Performed by: OBSTETRICS & GYNECOLOGY

## 2024-06-13 PROCEDURE — 2580000003 HC RX 258: Performed by: OBSTETRICS & GYNECOLOGY

## 2024-06-13 PROCEDURE — 6360000002 HC RX W HCPCS: Performed by: NURSE ANESTHETIST, CERTIFIED REGISTERED

## 2024-06-13 PROCEDURE — 88307 TISSUE EXAM BY PATHOLOGIST: CPT

## 2024-06-13 PROCEDURE — 85025 COMPLETE CBC W/AUTO DIFF WBC: CPT

## 2024-06-13 PROCEDURE — 51701 INSERT BLADDER CATHETER: CPT

## 2024-06-13 PROCEDURE — 2500000003 HC RX 250 WO HCPCS: Performed by: NURSE ANESTHETIST, CERTIFIED REGISTERED

## 2024-06-13 PROCEDURE — 36415 COLL VENOUS BLD VENIPUNCTURE: CPT

## 2024-06-13 PROCEDURE — 1220000000 HC SEMI PRIVATE OB R&B

## 2024-06-13 RX ORDER — FAMOTIDINE 20 MG/1
20 TABLET, FILM COATED ORAL 2 TIMES DAILY
Status: DISCONTINUED | OUTPATIENT
Start: 2024-06-13 | End: 2024-06-15 | Stop reason: HOSPADM

## 2024-06-13 RX ORDER — ACETAMINOPHEN 500 MG
1000 TABLET ORAL EVERY 8 HOURS PRN
Status: DISCONTINUED | OUTPATIENT
Start: 2024-06-13 | End: 2024-06-15 | Stop reason: HOSPADM

## 2024-06-13 RX ORDER — SODIUM CHLORIDE, SODIUM LACTATE, POTASSIUM CHLORIDE, CALCIUM CHLORIDE 600; 310; 30; 20 MG/100ML; MG/100ML; MG/100ML; MG/100ML
INJECTION, SOLUTION INTRAVENOUS CONTINUOUS
Status: DISCONTINUED | OUTPATIENT
Start: 2024-06-13 | End: 2024-06-15 | Stop reason: HOSPADM

## 2024-06-13 RX ORDER — OXYCODONE HYDROCHLORIDE 5 MG/1
10 TABLET ORAL EVERY 6 HOURS PRN
Status: DISCONTINUED | OUTPATIENT
Start: 2024-06-13 | End: 2024-06-15 | Stop reason: HOSPADM

## 2024-06-13 RX ORDER — ONDANSETRON 2 MG/ML
4 INJECTION INTRAMUSCULAR; INTRAVENOUS EVERY 6 HOURS PRN
Status: DISCONTINUED | OUTPATIENT
Start: 2024-06-13 | End: 2024-06-15 | Stop reason: HOSPADM

## 2024-06-13 RX ORDER — SODIUM CHLORIDE 9 MG/ML
INJECTION, SOLUTION INTRAVENOUS PRN
Status: DISCONTINUED | OUTPATIENT
Start: 2024-06-13 | End: 2024-06-13

## 2024-06-13 RX ORDER — BENZOCAINE/MENTHOL 6 MG-10 MG
LOZENGE MUCOUS MEMBRANE
Status: DISCONTINUED | OUTPATIENT
Start: 2024-06-13 | End: 2024-06-15 | Stop reason: HOSPADM

## 2024-06-13 RX ORDER — CARBOPROST TROMETHAMINE 250 UG/ML
250 INJECTION, SOLUTION INTRAMUSCULAR PRN
Status: DISCONTINUED | OUTPATIENT
Start: 2024-06-13 | End: 2024-06-15 | Stop reason: HOSPADM

## 2024-06-13 RX ORDER — BUPIVACAINE HYDROCHLORIDE 2.5 MG/ML
INJECTION, SOLUTION EPIDURAL; INFILTRATION; INTRACAUDAL PRN
Status: DISCONTINUED | OUTPATIENT
Start: 2024-06-13 | End: 2024-06-13 | Stop reason: SDUPTHER

## 2024-06-13 RX ORDER — OXYCODONE HYDROCHLORIDE 5 MG/1
5 TABLET ORAL EVERY 6 HOURS PRN
Status: DISCONTINUED | OUTPATIENT
Start: 2024-06-13 | End: 2024-06-15 | Stop reason: HOSPADM

## 2024-06-13 RX ORDER — SODIUM CHLORIDE, SODIUM LACTATE, POTASSIUM CHLORIDE, AND CALCIUM CHLORIDE .6; .31; .03; .02 G/100ML; G/100ML; G/100ML; G/100ML
500 INJECTION, SOLUTION INTRAVENOUS PRN
Status: DISCONTINUED | OUTPATIENT
Start: 2024-06-13 | End: 2024-06-15 | Stop reason: HOSPADM

## 2024-06-13 RX ORDER — DOCUSATE SODIUM 100 MG/1
100 CAPSULE, LIQUID FILLED ORAL 2 TIMES DAILY
Status: DISCONTINUED | OUTPATIENT
Start: 2024-06-13 | End: 2024-06-15 | Stop reason: HOSPADM

## 2024-06-13 RX ORDER — METHYLERGONOVINE MALEATE 0.2 MG/ML
200 INJECTION INTRAVENOUS PRN
Status: DISCONTINUED | OUTPATIENT
Start: 2024-06-13 | End: 2024-06-15 | Stop reason: HOSPADM

## 2024-06-13 RX ORDER — FERROUS SULFATE 325(65) MG
325 TABLET ORAL EVERY OTHER DAY
Status: DISCONTINUED | OUTPATIENT
Start: 2024-06-13 | End: 2024-06-15 | Stop reason: HOSPADM

## 2024-06-13 RX ORDER — TRANEXAMIC ACID 10 MG/ML
1000 INJECTION, SOLUTION INTRAVENOUS
Status: ACTIVE | OUTPATIENT
Start: 2024-06-13 | End: 2024-06-14

## 2024-06-13 RX ORDER — MISOPROSTOL 100 UG/1
400 TABLET ORAL PRN
Status: DISCONTINUED | OUTPATIENT
Start: 2024-06-13 | End: 2024-06-15 | Stop reason: HOSPADM

## 2024-06-13 RX ORDER — LANOLIN 100 %
OINTMENT (GRAM) TOPICAL PRN
Status: DISCONTINUED | OUTPATIENT
Start: 2024-06-13 | End: 2024-06-15 | Stop reason: HOSPADM

## 2024-06-13 RX ORDER — IBUPROFEN 800 MG/1
800 TABLET ORAL EVERY 8 HOURS PRN
Status: DISCONTINUED | OUTPATIENT
Start: 2024-06-13 | End: 2024-06-15 | Stop reason: HOSPADM

## 2024-06-13 RX ORDER — SODIUM CHLORIDE 9 MG/ML
INJECTION, SOLUTION INTRAVENOUS PRN
Status: DISCONTINUED | OUTPATIENT
Start: 2024-06-13 | End: 2024-06-15 | Stop reason: HOSPADM

## 2024-06-13 RX ORDER — ONDANSETRON 4 MG/1
4 TABLET, ORALLY DISINTEGRATING ORAL EVERY 6 HOURS PRN
Status: DISCONTINUED | OUTPATIENT
Start: 2024-06-13 | End: 2024-06-15 | Stop reason: HOSPADM

## 2024-06-13 RX ORDER — SODIUM CHLORIDE 0.9 % (FLUSH) 0.9 %
5-40 SYRINGE (ML) INJECTION PRN
Status: DISCONTINUED | OUTPATIENT
Start: 2024-06-13 | End: 2024-06-15 | Stop reason: HOSPADM

## 2024-06-13 RX ORDER — SODIUM CHLORIDE 0.9 % (FLUSH) 0.9 %
5-40 SYRINGE (ML) INJECTION EVERY 12 HOURS SCHEDULED
Status: DISCONTINUED | OUTPATIENT
Start: 2024-06-13 | End: 2024-06-15 | Stop reason: HOSPADM

## 2024-06-13 RX ORDER — SODIUM CHLORIDE, SODIUM LACTATE, POTASSIUM CHLORIDE, CALCIUM CHLORIDE 600; 310; 30; 20 MG/100ML; MG/100ML; MG/100ML; MG/100ML
INJECTION, SOLUTION INTRAVENOUS CONTINUOUS
Status: DISCONTINUED | OUTPATIENT
Start: 2024-06-13 | End: 2024-06-13

## 2024-06-13 RX ADMIN — Medication 10 ML: at 22:07

## 2024-06-13 RX ADMIN — Medication 166.7 ML: at 09:19

## 2024-06-13 RX ADMIN — WITCH HAZEL: 500 SOLUTION RECTAL; TOPICAL at 12:11

## 2024-06-13 RX ADMIN — BENZOCAINE AND LEVOMENTHOL: 200; 5 SPRAY TOPICAL at 12:11

## 2024-06-13 RX ADMIN — IBUPROFEN 800 MG: 800 TABLET, FILM COATED ORAL at 18:17

## 2024-06-13 RX ADMIN — DOCUSATE SODIUM 100 MG: 100 CAPSULE, LIQUID FILLED ORAL at 22:07

## 2024-06-13 RX ADMIN — ACETAMINOPHEN 1000 MG: 500 TABLET ORAL at 22:06

## 2024-06-13 RX ADMIN — SODIUM CHLORIDE, POTASSIUM CHLORIDE, SODIUM LACTATE AND CALCIUM CHLORIDE: 600; 310; 30; 20 INJECTION, SOLUTION INTRAVENOUS at 05:44

## 2024-06-13 RX ADMIN — ACETAMINOPHEN 1000 MG: 500 TABLET ORAL at 13:55

## 2024-06-13 RX ADMIN — Medication 15 ML/HR: at 06:00

## 2024-06-13 RX ADMIN — BUPIVACAINE HYDROCHLORIDE 1 ML: 2.5 INJECTION, SOLUTION EPIDURAL; INFILTRATION; INTRACAUDAL; PERINEURAL at 05:51

## 2024-06-13 RX ADMIN — SERTRALINE 25 MG: 50 TABLET, FILM COATED ORAL at 22:07

## 2024-06-13 ASSESSMENT — PAIN DESCRIPTION - ORIENTATION: ORIENTATION: LOWER

## 2024-06-13 ASSESSMENT — PAIN DESCRIPTION - DESCRIPTORS: DESCRIPTORS: DISCOMFORT

## 2024-06-13 ASSESSMENT — PAIN SCALES - GENERAL
PAINLEVEL_OUTOF10: 0
PAINLEVEL_OUTOF10: 3
PAINLEVEL_OUTOF10: 1

## 2024-06-13 ASSESSMENT — PAIN - FUNCTIONAL ASSESSMENT: PAIN_FUNCTIONAL_ASSESSMENT: ACTIVITIES ARE NOT PREVENTED

## 2024-06-13 ASSESSMENT — PAIN DESCRIPTION - LOCATION: LOCATION: ABDOMEN

## 2024-06-13 NOTE — PROGRESS NOTES
First time get up to BR w/ assist x 2 RN's. Pt voided 600 cc urine without difficulty. Pt performed chau care per self after instruction. New ice pack, peripad, and underwear provided. Pt transferred to room 320 via wheelchair. Accompanied by infant in crib, personal belongings, and FOB. Pt back to bed w/ out incident, gait steady. Pt tolerated well.

## 2024-06-13 NOTE — ANESTHESIA PROCEDURE NOTES
CSE Block    Patient location during procedure: OB  Start time: 6/13/2024 5:47 AM  End time: 6/13/2024 6:00 AM  Reason for block: labor epidural  Staffing  Performed: resident/CRNA   Resident/CRNA: Kiki Singh APRN - ANDI  Performed by: Kiki Singh APRN - CRNA  Authorized by: Jam Bruce MD    CSE  Patient position: sitting  Prep: ChloraPrep and site prepped and draped  Patient monitoring: continuous pulse ox and frequent blood pressure checks  Approach: midline  Provider prep: mask and sterile gloves  Spinal Needle  Needle type: pencil-tip   Needle gauge: 25 G  Needle length: 4.75 in  Epidural Needle  Injection technique: JANUARY saline  Needle type: Tuohy   Needle gauge: 17 G  Needle length: 3.5 in  Needle insertion depth: 5 cm  Location: lumbar (1-5)  Catheter  Catheter type: side hole  Catheter size: 19 G  Catheter at skin depth: 10 cm  Test dose: negative  Assessment  Hemodynamics: stable  Additional Notes  Patient in sitting position.  Sterile prep and drape applied to back.  Skin localization with 5ml of lidocaine 1%.  LORT with NS, CSE with 25g pencan- return of clear free flowing csf.  Epidural catheter advanced easily.  Test dose given incrementally after negative aspirate from catheter.  Sterile dressing applied.  Patient tolerated procedure well.  Preanesthetic Checklist  Completed: patient identified, IV checked, site marked, risks and benefits discussed, surgical/procedural consents, equipment checked, pre-op evaluation, timeout performed, anesthesia consent given, oxygen available, monitors applied/VS acknowledged and blood product R/B/A discussed and consented

## 2024-06-13 NOTE — ANESTHESIA PRE PROCEDURE
Department of Anesthesiology  Preprocedure Note       Name:  Denise Moore   Age:  24 y.o.  :  2000                                          MRN:  [id number]         Date:  2024      Surgeon: * Surgery not found *    Procedure:     Medications prior to admission:   Prior to Admission medications    Medication Sig Start Date End Date Taking? Authorizing Provider   dexAMETHasone 20 MG TABS Take 20 mg by mouth daily (with breakfast) for 4 days 24  Marquita Coyne DO   levothyroxine (SYNTHROID) 25 MCG tablet TAKE 1 TABLET BY MOUTH DAILY 24   Yessica Boyle DO   sertraline (ZOLOFT) 25 MG tablet Take 1 tablet by mouth daily    ProviderDeborah MD   Prenatal MV-Min-Fe Fum-FA-DHA (PRENATAL 1 PO) Take by mouth    ProviderDeborah MD   famotidine (PEPCID) 20 MG tablet Take 1 tablet by mouth 2 times daily 23   Yessica Boyle DO   fluticasone (FLONASE) 50 MCG/ACT nasal spray 2 sprays by Each Nostril route daily 22   Brie Luciano DO       Current medications:    Current Facility-Administered Medications   Medication Dose Route Frequency Provider Last Rate Last Admin    sodium chloride 0.9 % 200 mL with fentaNYL 500 mcg, BUPivacaine 0.5% 50 mL (OB) epidural             lactated ringers IV soln infusion   IntraVENous Continuous Luana Hancock  mL/hr at 24 0544 New Bag at 24 0544    terbutaline (BRETHINE) injection 0.25 mg  0.25 mg SubCUTAneous Once PRN Luana Hancock MD        sodium chloride flush 0.9 % injection 5-40 mL  5-40 mL IntraVENous 2 times per day Luana Hancock MD        sodium chloride flush 0.9 % injection 5-40 mL  5-40 mL IntraVENous PRN Luana Hancock MD        0.9 % sodium chloride infusion  25 mL IntraVENous PRN Luana Hancock MD        acetaminophen (TYLENOL) tablet 650 mg  650 mg Oral Q4H PRN Luana Hancock MD        witch hazel-glycerin (TUCKS) pad   Topical PRN Luana Hanocck MD        benzocaine-menthol (DERMOPLAST) 20-0.5 %

## 2024-06-13 NOTE — LACTATION NOTE
This note was copied from a baby's chart.  LACTATION CONSULTATION  Initial Lactation Consult:  Referred by: RN request    Name: Girl Denise Moore       MRN: [id number]         YOB: 2025   Time of Birth: 9:06 AM   Gestational age: Gestational Age: 39w6d   Birth Weight: Birth Weight: N/A Most Recent Weight:     Weight Change from Birth: Birth weight not on file           Maternal Assessment:  Maternal Data:  Information for the patient's mother:  Denise Moore [[id number]]   25 y.o.   /Para:   Information for the patient's mother:  Denise Moore [[id number]]      Information for the patient's mother:  Denise Moore [[id number]]   39w6d     Prenatal Breastfeeding Education: None    Prior Breastfeeding Experience: 1st time breastfeeding with this baby     Breastfeeding Goal: Exclusively Breastfeed states that she may offer some formula over the first few weeks. LC provided education on breast milk production and effects of offering formula without medical indication.     Breast Assessment  Right Breast: WDL  Right Nipple: Everts well   Right Areola: WDL   Right Nipple Comfort: comfortable   Right Nipple Integrity: Intact    Left Breast: WDL  Left Nipple: Everts well   Left Areola: WDL   Left Nipple Comfort: comfortable   Left Nipple Integrity: Intact    Medications of Concern:Zoloft (L2) Education on the compatibility with breastfeeding and continuing the medication.    Maternal Toxicology:   Information for the patient's mother:  Denise Moore [[id number]]     Barbiturate Screen, Ur   Date Value Ref Range Status   2024 Neg Negative <200 ng/mL Final     Benzodiazepine Screen, Urine   Date Value Ref Range Status   2024 Neg Negative <200 ng/mL Final     Cannabinoid Scrn, Ur   Date Value Ref Range Status   2024 Neg Negative <50 ng/mL Final     Cocaine Metabolite Screen, Urine   Date Value Ref Range Status   2024 Neg Negative <300 ng/mL Final     Methadone Screen, Urine

## 2024-06-13 NOTE — PROGRESS NOTES
This RN pulled epidural catheter out at this time.  No bleeding noted at site.  Instructed patient to call to report any severe back pain or numbness in legs and arms. Verbalized understanding.

## 2024-06-13 NOTE — PROGRESS NOTES
Labor Progress Note  [facility]    Subjective:   Patient is seen and examined. Doing well, no concerns/complaints.  Comfortable with epidural.     Objective:  /75   Pulse 67   Temp 98.4 °F (36.9 °C) (Oral)   Resp 16   Ht 1.6 m (5' 3\")   Wt 63 kg (139 lb)   LMP 09/08/2023 (Exact Date)   SpO2 99%   BMI 24.62 kg/m²     Cervix:  Dilation (cm): Lip/Rim (comment)  Effacement: 90  Station: +1  Presentation: Vertex (confirmed with US)  OB Examiner: ALICE Mendoza RN    FHTs: 135 baseline, mod lou, neg decels  [place]: q3-4 min    Infusions:    lactated ringers IV soln 125 mL/hr at 06/13/24 0544    sodium chloride      oxytocin Stopped (06/13/24 0500)     ?  Assessment/Plan:  1. Intrapartum  - FHTs reassuring   - Continue expectant mgmt.    Yessica Boyle, DO

## 2024-06-13 NOTE — L&D DELIVERY NOTE
[facility] Obstetrics and Gynecology  Spontaneous Vaginal Delivery Note        Pre-operative Diagnosis:    Denise Moore is a 24 y.o.  at 39w6d with spontaneous labor  O pos / RI / GBS neg  Thrombocytopenia  CF carrier  Hypothyroidism  Mood disorder  Velamentous cord insertion      Post-operative Diagnosis:   Denise Moore is a 24 y.o.  at 39w6d with spontaneous labor  O pos / RI / GBS neg  Thrombocytopenia  CF carrier  Hypothyroidism  Mood disorder  Velamentous cord insertion              Anesthesia:  Epidural     Procedure: Normal spontaneous vaginal delivery    Assistant: JUANCHO Thompson PGY1     Admission and Delivery:       Patient presented to [facility] Labor [facility] Delivery with complaint of regular contractions on 2024. Patient was admitted for spontaneous labor. She was 3-4/70/-2 on admission.  She was admitted, given epidural for pain control. Spontaneous rupture of membranes with thick meconium present.    Patient progressed spontaneously to complete cervical dilation.  Began pushing and with good maternal effort pushed for approximately 50 minutes.  A viable female infant was delivered in the YUNI position over intact perineum. Nuchal cord was not present. The shoulders and body were quickly to follow with maternal efforts. Infant was delivered with good tone and spontaneous cry without complication. Mouth and nose were bulb suctioned at maternal abdomen.  Delayed cord clamping was performed.  Cord segment and cord blood were obtained.  Cord gasses were collected, however due to fetal wellbeing were discarded.  APGARS were noted to be 8 and 9. Delivery Date and Time: 2024 at 0906.      The placenta was delivered spontaneously with manual massage of the uterus and was noted to be intact with a 3 vessel cord. Lower uterine segment was noted to be free of clot and debris.  Placenta was inspected and noted to be intact, bilobed with velamentous cord present at the insertion site.  Pitocin

## 2024-06-13 NOTE — PROGRESS NOTES
Spoke with EDIS Patino CRNA via telephone.  States she spoke with Dr. Max regarding patient platelet level and if appropriate to pull catheter.  States okay to pull catheter at this time. Will monitor for s/s hematoma at insertion site.

## 2024-06-14 LAB
BASOPHILS # BLD: 0 K/UL (ref 0–0.2)
BASOPHILS # BLD: 0 K/UL (ref 0–0.2)
BASOPHILS NFR BLD: 0.2 %
BASOPHILS NFR BLD: 0.2 %
DEPRECATED RDW RBC AUTO: 17 % (ref 12.4–15.4)
DEPRECATED RDW RBC AUTO: 17.2 % (ref 12.4–15.4)
EOSINOPHIL # BLD: 0 K/UL (ref 0–0.6)
EOSINOPHIL # BLD: 0.1 K/UL (ref 0–0.6)
EOSINOPHIL NFR BLD: 0.5 %
EOSINOPHIL NFR BLD: 1.3 %
HCT VFR BLD AUTO: 35.9 % (ref 36–48)
HCT VFR BLD AUTO: 37.6 % (ref 36–48)
HGB BLD-MCNC: 12.2 G/DL (ref 12–16)
HGB BLD-MCNC: 12.9 G/DL (ref 12–16)
LYMPHOCYTES # BLD: 1.8 K/UL (ref 1–5.1)
LYMPHOCYTES # BLD: 2 K/UL (ref 1–5.1)
LYMPHOCYTES NFR BLD: 15.2 %
LYMPHOCYTES NFR BLD: 19.8 %
MCH RBC QN AUTO: 31.7 PG (ref 26–34)
MCH RBC QN AUTO: 31.8 PG (ref 26–34)
MCHC RBC AUTO-ENTMCNC: 33.9 G/DL (ref 31–36)
MCHC RBC AUTO-ENTMCNC: 34.3 G/DL (ref 31–36)
MCV RBC AUTO: 92.7 FL (ref 80–100)
MCV RBC AUTO: 93.4 FL (ref 80–100)
MONOCYTES # BLD: 0.6 K/UL (ref 0–1.3)
MONOCYTES # BLD: 0.7 K/UL (ref 0–1.3)
MONOCYTES NFR BLD: 5.8 %
MONOCYTES NFR BLD: 6 %
NEUTROPHILS # BLD: 7.6 K/UL (ref 1.7–7.7)
NEUTROPHILS # BLD: 9.2 K/UL (ref 1.7–7.7)
NEUTROPHILS NFR BLD: 73.5 %
NEUTROPHILS NFR BLD: 77.5 %
PLATELET # BLD AUTO: 107 K/UL (ref 135–450)
PLATELET # BLD AUTO: 77 K/UL (ref 135–450)
PMV BLD AUTO: 8.4 FL (ref 5–10.5)
PMV BLD AUTO: 8.8 FL (ref 5–10.5)
RBC # BLD AUTO: 3.85 M/UL (ref 4–5.2)
RBC # BLD AUTO: 4.05 M/UL (ref 4–5.2)
WBC # BLD AUTO: 10.3 K/UL (ref 4–11)
WBC # BLD AUTO: 11.8 K/UL (ref 4–11)

## 2024-06-14 PROCEDURE — 2580000003 HC RX 258: Performed by: OBSTETRICS & GYNECOLOGY

## 2024-06-14 PROCEDURE — 36415 COLL VENOUS BLD VENIPUNCTURE: CPT

## 2024-06-14 PROCEDURE — 6370000000 HC RX 637 (ALT 250 FOR IP): Performed by: OBSTETRICS & GYNECOLOGY

## 2024-06-14 PROCEDURE — 99024 POSTOP FOLLOW-UP VISIT: CPT | Performed by: OBSTETRICS & GYNECOLOGY

## 2024-06-14 PROCEDURE — 85025 COMPLETE CBC W/AUTO DIFF WBC: CPT

## 2024-06-14 PROCEDURE — 1220000000 HC SEMI PRIVATE OB R&B

## 2024-06-14 RX ADMIN — DOCUSATE SODIUM 100 MG: 100 CAPSULE, LIQUID FILLED ORAL at 10:51

## 2024-06-14 RX ADMIN — Medication 10 ML: at 22:21

## 2024-06-14 RX ADMIN — SERTRALINE 75 MG: 50 TABLET, FILM COATED ORAL at 22:20

## 2024-06-14 RX ADMIN — ACETAMINOPHEN 1000 MG: 500 TABLET ORAL at 16:59

## 2024-06-14 RX ADMIN — ACETAMINOPHEN 1000 MG: 500 TABLET ORAL at 06:00

## 2024-06-14 RX ADMIN — IBUPROFEN 800 MG: 800 TABLET, FILM COATED ORAL at 02:01

## 2024-06-14 RX ADMIN — DOCUSATE SODIUM 100 MG: 100 CAPSULE, LIQUID FILLED ORAL at 22:21

## 2024-06-14 RX ADMIN — IBUPROFEN 800 MG: 800 TABLET, FILM COATED ORAL at 10:50

## 2024-06-14 RX ADMIN — IBUPROFEN 800 MG: 800 TABLET, FILM COATED ORAL at 22:20

## 2024-06-14 ASSESSMENT — PAIN DESCRIPTION - LOCATION
LOCATION: PERINEUM
LOCATION: ABDOMEN
LOCATION: ABDOMEN

## 2024-06-14 ASSESSMENT — PAIN DESCRIPTION - DESCRIPTORS
DESCRIPTORS: HEAVINESS
DESCRIPTORS: SORE
DESCRIPTORS: ACHING

## 2024-06-14 ASSESSMENT — PAIN DESCRIPTION - ORIENTATION
ORIENTATION: LOWER
ORIENTATION: LOWER

## 2024-06-14 ASSESSMENT — PAIN SCALES - GENERAL
PAINLEVEL_OUTOF10: 0
PAINLEVEL_OUTOF10: 2

## 2024-06-14 ASSESSMENT — PAIN - FUNCTIONAL ASSESSMENT
PAIN_FUNCTIONAL_ASSESSMENT: ACTIVITIES ARE NOT PREVENTED

## 2024-06-14 NOTE — LACTATION NOTE
This note was copied from a baby's chart.  LACTATION CONSULTATION      Follow-up Consult: Reason for Follow-up Requests f/u support with latching.         Name: Girl Denise Moore       MRN: [id number]               YOB: 2025   Time of Birth: 9:06 AM   Gestational age: Gestational Age: 39w6d   Birth Weight: Birth Weight: 3.23 kg (7 lb 1.9 oz) Most Recent Weight: Weight: 3.197 kg (7 lb 0.8 oz)   Weight Change from Birth: -1%            Maternal Assessment:      Maternal Data:   Information for the patient's mother:  Denise Moore [[id number]]   25 y.o.    /Para:   Information for the patient's mother:  Denise Moore [[id number]]        Information for the patient's mother:  Denise Moore [[id number]]   39w6d          Breast Assessment      Left Breast: WDL  Left Nipple: Everts well   Left Areola: WDL   Left Nipple Comfort:  comfortable with this feeding; c/o soreness and pinching at times.  Left Nipple Integrity: Intact     Infant Assessment:    DOL:30 hours of life      Feeding: Breastfeeding      I&O adequacy:  Urine output: is established  Stool output: is established  Percent weight change from birthweight: -1%     Birth Factors/Diagnosis that could create risk for breastfeeding:     Glucose: No       Intervention during consultation:     Interventions Performed:   Assisted with breastfeeding , Education , and Skin to skin     Latch & Positioning: Infant drowsy at the breast and mother requests support with latching. C/o sore nipples and pinching with some latches. Gave tips for waking infant. Infant undressed and placed STS at the breast. Shown how to position baby well at the breast in cross cradle hold with baby belly to belly, nose to nipple, with good alignment at the breast. Shown how to support the breast behind the areola, hand expressing colostrum to entice infant to latch. Large drops expressed easily and fed to infant. Infant rooting with wide open mouth, SATISH achieved with SRS  and AS. Mother confirmed that the latch is comfortable, reassured of SATISH with this feeding and gave praise. Educated on how a good latch should look and feel, and instructed on how to break the suction of the latch and attempt to relatch more deeply as needed. Educated that nipple should be rounded when baby releases from the breast, without creasing or compression.      Manual Expression:  Expresses easily, Drops obtained, and MOB demonstrates well     Amount of milk expressed:   Drops    Pump Arrangements:  Owns breast pump    Education:  Latch & positioning , Feeding frequency & feeding cues , Exclusive breastfeeding , Breastfeeding Booklet reviewed , No artificial nipples , Expected intake and output , Feeding and diaper log , Skin to skin , and Hand expression           Action/Plan:  Breastfeed on cue and at least 8 times/24 hours, unlimited timing. May not nurse this often in the first 24 hours. Wake baby and offer breastfeeding if it has been 3 hours since the beginning of last feeding. Place infant skin to skin if infant will not breastfeed at 3 hours.   Hand express prior to latch to kajal nipple and entice infant to the breast.   It is important to use gentle stimulation during feeding to promote active eating. Offer both breasts at every feeding. Burp infant in between sides. Alternate which breast is used first.   Offer STS often while awake. Mother holding infant skin to skin between feedings will promote milk supply and allow infant to rest more deeply.   Maintain a feeding log until infant is gaining weight and seen by primary care physician.   Request breastfeeding assistance from LC or RN as needed.     Feeding Plan reviewed with: Parents     Response:   Verbalized understanding of education and instruction, Active in care, Demonstrates latch well , and Bonding well . Will call for f/u support prn.

## 2024-06-14 NOTE — LACTATION NOTE
This note was copied from a baby's chart.  LACTATION CONSULTATION      Follow-up Consult: Reason for Follow-up Requests f/u support with latching.          Name: Girl Denise Moore       MRN: [id number]                YOB: 2025   Time of Birth: 9:06 AM   Gestational age: Gestational Age: 39w6d   Birth Weight: Birth Weight: 3.23 kg (7 lb 1.9 oz) Most Recent Weight: Weight: 3.197 kg (7 lb 0.8 oz)   Weight Change from Birth: -1%             Maternal Assessment:      Maternal Data:   Information for the patient's mother:  Denise Moore [[id number]]   25 y.o.    /Para:   Information for the patient's mother:  Denise Moore [[id number]]        Information for the patient's mother:  Denise Moore [[id number]]   39w6d          Breast Assessment        Left Breast: WDL  Left Nipple: Everts well   Left Areola: WDL   Left Nipple Comfort:  comfortable with this feeding; c/o soreness and pinching at times.  Left Nipple Integrity: Intact     Infant Assessment:    DOL:30 hours of life      Feeding: Breastfeeding      I&O adequacy:  Urine output: is established  Stool output: is established  Percent weight change from birthweight: -1%     Birth Factors/Diagnosis that could create risk for breastfeeding:      Glucose: No        Intervention during consultation:      Interventions Performed:   Assisted with breastfeeding , Education , and Skin to skin      Latch & Positioning: Infant drowsy at the breast and mother requests support with latching. C/o sore nipples and pinching with some latches. Infant already STS with mother at the breast in good position. Gave praise. Gentle stimulation to wake infant, and encouraged mother to hand express colostrum to entice infant to latch. Infant drowsy but began rooting with colostrum expressed. SATISH achieved with few attempts with SRS and AS. Mother confirmed that the latch was more comfortable, reassured of SATISH with this feeding and gave praise. Educated on how a good

## 2024-06-14 NOTE — PROGRESS NOTES
[facility] Ob/Gyn  Post Partum Progress Note    Subjective:   Patient is a 24 y.o. y/o  female S/P  at 39w6d EGA. PPD # 1.     Pregnancy has been complicated by thrombocytopenia, hypothyroidism, CF carrier status, velamentous cord insertion, and mood disorder.     Doing well today. No current complaints are noted including headache, change in vision, fever, chills, chest pain, shortness of breath, nausea, vomiting, diarrhea or constipation. The patient denies dizziness with ambulation or calf tenderness. Voiding spontaneously. Lochia is described as moderate. The patient plans to breastfeed.    Objective:   GENERAL APPEARANCE: alert, well appearing, in no apparent distress  LUNGS: Resp effort normal   HEART: Reg rate   ABDOMEN POSTPARTUM: benign non-tender, without masses or organomegaly palpable . Uterine Fundus firm, NT, Below umbilicus.   EXTREMITIES: no redness or tenderness in the calves or thighs, no edema  SKIN: normal coloration and turgor, no rashes, no suspicious skin lesions noted    Pertinent Labs:   H/H: 12.2/35.9 > 11.0/32.8    Assessment / Plan:  1) Post partum    - meeting post partum milestones    - hemodynamically stable     2) O+ / RI / GBS (-)     3) Baby Information    - Delivery Time / Date: Delivered 2024 0906    - APGARS: 8, 9    - Birth weight: 3.23 kg (7 lb 1.9 oz)    - Feeding: Breast     - Gender: Female     4) Thrombocytopenia   - Plt 109 > 78 > 77   - rpt CBC tomorrow    - admits to moderate lochia   - will continue to monitor    Disposition:   Post Partum Instructions reviewed   Anticipate discharge on PPD # 2-3 pending clinical status     Marquita Coyne DO

## 2024-06-14 NOTE — LACTATION NOTE
LACTATION CONSULTATION  Initial Lactation Consult:  Referred by: RN request    Name: Denise Moore       MRN: [id number]         YOB: 2000   Time of Birth:    Gestational age: Gestational Age: <None>   Birth Weight: Birth Weight: N/A Most Recent Weight: Weight - Scale: 63 kg (139 lb)   Weight Change from Birth: Birth weight not on file           Maternal Assessment:  Maternal Data:  This patient's mother is not on file.  /Para: This patient's mother is not on file.  This patient's mother is not on file.    Prenatal Breastfeeding Education: Prenatal Provider    Prior Breastfeeding Experience: 1st time breastfeeding with this baby     Breastfeeding Goal: Supplement with Formula     Breast Assessment  Right Breast: Moderate  Right Nipple: Everts well , Short, and Elastic   Right Areola: WDL   Right Nipple Comfort: comfortable   Right Nipple Integrity: Intact    Left Breast: Breasts not assessed this encounter       Maternal Toxicology: This patient's mother is not on file.      Delivery Information:  Born on 2000 at   Delivery method: No data found  Additional Information:  Forceps attempted?    Vacuum extractor attempted?    Breech:   Complications:     Infant Assessment:    DOL:Infant sucking well, tongue drops down.  Strong suck  Feeding: Breastfeeding     Nipple Shield in Use: Yes/ No      I&O adequacy:  Urine output: is established  Percent weight change from birthweight: Birth weight not on file    Oral Assessment   Palate: Normal no bubble palate  Frenulum: not noted.  Did not feel one during oral assessment    TABBY:     TABBY SCORE:________8______________        Scoring of TABBY tool  A score of:   8 indicates normal tongue function;   6 or 7 are considered as borderline: suggest a                              'wait and see' approach with support for  breastfeeding positioning & attachment;   5 or below suggests that there is impairment of  tongue function.    Intervention During

## 2024-06-14 NOTE — ANESTHESIA POSTPROCEDURE EVALUATION
Department of Anesthesiology  Postprocedure Note    Patient: Denise Moore  MRN: [id number]  YOB: 2000  Date of evaluation: 6/14/2024    Procedure Summary       Date: 06/13/24 Room / Location:     Anesthesia Start: 0537 Anesthesia Stop: 0906    Procedure: Labor Analgesia Diagnosis:     Scheduled Providers:  Responsible Provider: Abraham Max MD    Anesthesia Type: Epidural ASA Status: 2            Anesthesia Type: Epidural    Janie Phase I: Janie Score: 10    Janie Phase II:      Anesthesia Post Evaluation    Patient location during evaluation: bedside  Patient participation: complete - patient participated  Level of consciousness: awake and alert  Airway patency: patent  Nausea & Vomiting: no vomiting and no nausea  Cardiovascular status: hemodynamically stable  Respiratory status: spontaneous ventilation, room air and nonlabored ventilation  Hydration status: stable  Comments: Pt seen and examined.  No apparent anesthetic complications noted at this time  Multimodal analgesia pain management approach  Pain management: adequate and satisfactory to patient        No notable events documented.

## 2024-06-14 NOTE — PLAN OF CARE
Problem: Vaginal Birth or  Section  Goal: Able to cope with pain  Description: Able to cope with pain  2024 by Candy Fulton RN  Outcome: Progressing  2024 by Karley Johnson RN  Outcome: Progressing     Problem: Pain  Goal: Able to cope with pain  Description: Able to cope with pain  2024 by Candy Fulton, RN  Outcome: Progressing  2024 by Karley Johnson RN  Outcome: Progressing  Goal: Verbalizes/displays adequate comfort level or baseline comfort level  2024 by Candy Fulton RN  Outcome: Progressing  2024 by Karley Johnson RN  Outcome: Progressing     Problem: Safety - Adult  Goal: Able to ambulate with minimal assistance  Description: Able to ambulate with minimal assistance  2024 by Candy Fulton RN  Outcome: Progressing  2024 by Karley Johnson RN  Outcome: Progressing

## 2024-06-14 NOTE — PLAN OF CARE
Problem: Vaginal Birth or  Section  Goal: Able to cope with pain  Description: Able to cope with pain  Outcome: Progressing     Problem: Pain  Goal: Able to cope with pain  Description: Able to cope with pain  Outcome: Progressing  Goal: Verbalizes/displays adequate comfort level or baseline comfort level  Outcome: Progressing     Problem: Safety - Adult  Goal: Able to ambulate with minimal assistance  Description: Able to ambulate with minimal assistance  Outcome: Progressing

## 2024-06-14 NOTE — LACTATION NOTE
This note was copied from a baby's chart.  LACTATION CONSULTATION      Follow-up Consult: Reason for Follow-up patient request latch assist.         Name: Girl Denise Moore       MRN: [id number]               YOB: 2025   Time of Birth: 9:06 AM   Gestational age: Gestational Age: 39w6d   Birth Weight: Birth Weight: 3.23 kg (7 lb 1.9 oz) Most Recent Weight: Weight: 3.197 kg (7 lb 0.8 oz)   Weight Change from Birth: -1%            Maternal Assessment:      Maternal Data:   Information for the patient's mother:  Denise Moore [[id number]]   25 y.o.    /Para:   Information for the patient's mother:  Denise Moore [[id number]]        Information for the patient's mother:  Denise Moore [[id number]]   39w6d          Breast Assessment  Right Breast: WDL  Right Nipple: Everts well   Right Areola: WDL   Right Nipple Comfort: comfortable   Right Nipple Integrity: Intact    Left Breast: WDL  Left Nipple: Everts well   Left Areola: WDL   Left Nipple Comfort: sore  Left Nipple Integrity: Intact     Infant Assessment:    DOL:infant 26 hours old.      Feeding: Breastfeeding       I&O adequacy:  Urine output: is established  Stool output: is established  Percent weight change from birthweight: -1%     Oral Assessment:   Palate:intact   Frenulum: did not observe   Frenotomy Performed: n/a       TABBY SCORE:________8______________        Scoring of TABBY tool  A score of:   8 indicates normal tongue function;   6 or 7 are considered as borderline: suggest a                              'wait and see' approach with support for  breastfeeding positioning & attachment;   5 or below suggests that there is impairment of  tongue function.       Birth Factors/Diagnosis that could create risk for breastfeeding:       Glucose: No       Intervention during consultation:     Interventions Performed:   Assisted with breastfeeding , Education , and Skin to skin     Latch & Positioning: Reviewed how to position infant to  right breast in cross cradle hold. MOB needing reinforcement on turing infant belly to belly towards her, and nose to nipple. Assisted mob with placing infant to breast, and then with some coaching, mob was able to bring infant onto breast with wide open mouth and achieve deep latch onto breast. Observed infant at breast over the next 15 minutes with strong rhythmic sucking noted, audible swallows. MOB confirms latch is comfortable. MOB comfortable with position to breast. Infant continues feeding after consult.    Manual Expression:  MOB states she understands     Bedside Breast Pump:   N/A    Breast Shield Size:   N/A    Amount of milk expressed:   Drops    Pump Arrangements:  Owns breast pump    Pump Distributed: No     Education:  Latch & positioning , Feeding frequency & feeding cues , Exclusive breastfeeding , Expected intake and output , Feeding and diaper log , Skin to skin , [facility] , and [facility]           Action/Plan:  Breastfeed on cue and at least 8 times/24 hours, unlimited timing. May not nurse this often in the first 24 hours. Wake baby and offer breastfeeding if it has been 3 hours since the beginning of last feeding. Place infant skin to skin if infant will not breastfeed at 3 hours.   Hand express prior to latch to kajal nipple and entice infant to the breast.   It is important to use gentle stimulation during feeding to promote active eating. Offer both breasts at every feeding. Burp infant in between sides. Alternate which breast is used first.   Offer STS often while awake. Mother holding infant skin to skin between feedings will promote milk supply and allow infant to rest more deeply.   Maintain a feeding log until infant is gaining weight and seen by primary care physician.   Request breastfeeding assistance from LC or RN as needed.     Feeding Plan reviewed with: Mom    Response:   Verbalized understanding of education and instruction,

## 2024-06-15 VITALS
TEMPERATURE: 98 F | WEIGHT: 139 LBS | HEIGHT: 63 IN | HEART RATE: 83 BPM | RESPIRATION RATE: 16 BRPM | BODY MASS INDEX: 24.63 KG/M2 | DIASTOLIC BLOOD PRESSURE: 72 MMHG | SYSTOLIC BLOOD PRESSURE: 110 MMHG | OXYGEN SATURATION: 96 %

## 2024-06-15 PROCEDURE — 6370000000 HC RX 637 (ALT 250 FOR IP): Performed by: OBSTETRICS & GYNECOLOGY

## 2024-06-15 PROCEDURE — 59409 OBSTETRICAL CARE: CPT | Performed by: OBSTETRICS & GYNECOLOGY

## 2024-06-15 RX ORDER — IBUPROFEN 800 MG/1
800 TABLET ORAL EVERY 8 HOURS PRN
Qty: 60 TABLET | Refills: 0 | Status: SHIPPED | OUTPATIENT
Start: 2024-06-15

## 2024-06-15 RX ORDER — SERTRALINE HYDROCHLORIDE 25 MG/1
75 TABLET, FILM COATED ORAL NIGHTLY
Qty: 30 TABLET | Refills: 0 | Status: SHIPPED | OUTPATIENT
Start: 2024-06-15

## 2024-06-15 RX ADMIN — BENZOCAINE AND LEVOMENTHOL: 200; 5 SPRAY TOPICAL at 05:37

## 2024-06-15 RX ADMIN — WITCH HAZEL: 500 SOLUTION RECTAL; TOPICAL at 05:37

## 2024-06-15 RX ADMIN — DOCUSATE SODIUM 100 MG: 100 CAPSULE, LIQUID FILLED ORAL at 09:30

## 2024-06-15 NOTE — PROGRESS NOTES
Called to notify Dr. Coyne pt reports blurry vision in left eye. Order to check oxygen, have anesthesia see pt, and to check platelets.

## 2024-06-15 NOTE — PROGRESS NOTES
Called by RN to see patient who reports blurry vision.  Patient states she was sitting up in bed watching tv and saw \"squiggles\" bilaterally.  Episode lasted 15 minutes.  No c/o HA, dizziness or lightheadedness.  BP was not checked at time of episode but at time of my visit was 120/75, HR 96.  Neuro exam WNL at this time.  Patient last took oxycodone at 1000.  Patient reassured at this time that I don't have any concerns from an anesthetic standpoint.  Reviewed s/s of epidural hematoma with patient as she was concerned blurry vision could be related.  Patient denies parasthesia, numbness or weakness in lower extremities. Encouraged patient to call again if vision changes occur.  Discussed with Tasneem GIRALDO.  Will reassess as indicated.      Kiki Singh CRNA    Patient A+OX4, able to express needs and have meaningful conversation. Spoke in full detail with patient regarding life sustaining treatment. Patient states that he make decisions and if he is unable to that his Health Care Agent is his cousin Germán Rhoades. Patient expresses that in the event of cardiac and respiratory arrest he wants to be resuscitated with chest compressions and a trial of NIV and mechanical ventilation. Patient expresses that he does not want to be kept alive artificially for long term with tracheostomy or long term feeding tube. Patient is a full code. Patient encouraged to verbalize concerns and emotional support given. Patient A+OX4, able to express needs and have meaningful conversation. Spoke in full detail with patient regarding life sustaining treatment. Patient states that he make decisions and if he is unable to that his Health Care Agent is his brother Germán Rhoades. Patient expresses that in the event of cardiac and respiratory arrest he wants to be resuscitated with chest compressions and a trial of NIV and mechanical ventilation. Patient expresses that he does not want to be kept alive artificially for long term with tracheostomy or long term feeding tube. Patient is a full code. Patient encouraged to verbalize concerns and emotional support given.

## 2024-06-15 NOTE — DISCHARGE INSTRUCTIONS
milk.  Avoid stimulation to your breasts. When showering, allow the water to strike only your back.  Wear a good fitting bra, such as a sports bra, until your milk dries up    OTHER REASONS TO CALL YOUR DOCTOR    Your abdomen is tender to the touch or you have pain that cannot be relieved.  Flu-like symptoms such as achy muscles and joints or you are experiencing extreme weakness or dizziness.  Persistent burning or increased urgency in urination.      LITERATURE PROVIDED    For Moms and Those Who Care About Them  Your 's Healthy Start, Grow Smart  Breastfeeding Best for Baby, Best for Mom.  [facility] Parent Information About Universal Hearing Screening  Controlling pain.    I have received the educational material listed above.  The  Channel has provided me with the opportunity to view instructional videos pertaining to infant care and the care of myself.    I verify that I have received the above information and have no further questions and feel confident to care for myself and my baby.    For more information about postpartum care, baby care and feeding, create a [facility] My Chart account, sign in and search using the magnifying glass, typing in postpartum, breast feeding or formula feeding.  [email or web address]

## 2024-06-15 NOTE — DISCHARGE SUMMARY
[facility] Ob/Gyn  Obstetric Discharge Summary        Admitting Diagnosis:   Denise Moore is a 24 y.o.  at 39w6d with spontaneous labor  O pos / RI / GBS neg  Thrombocytopenia  CF carrier  Hypothyroidism  Mood disorder  Velamentous cord insertion     Discharge Diagnosis:  Denise Moore is a 24 y.o.  at 39w6d with spontaneous labor  O pos / RI / GBS neg  Thrombocytopenia  CF carrier  Hypothyroidism  Mood disorder  Velamentous cord insertion     Procedures:   1. , repair of 1st degree and right labial laceration    Referrals:    - Lactation Consultant       Information:      - Delivery date/time: 2024 at 0906     - Gender: Female     - Weight: 7lbs 1.9oz     - Apgars 8 & 9     - Feeding: Breast    Discharge Instructions:  Please call for increased pain not controlled by pain medications, significant vaginal bleeding greater than 1 pad/hour, temperature greater than 101 degrees F or any other concerns. Plan to follow up in 2 weeks.      Diet:common adult    Activity:  Increase activity gradually.  No heavy lifting or driving for 2 weeks.  Pelvic rest for 6 weeks.  No intercourse, tampons, douching, baths.       Medications:   - Motrin     Disposition: Home    Condition on discharge: Stable    Follow up: in 2 week(s)    Yessica Boyle DO

## 2024-06-15 NOTE — PLAN OF CARE
Problem: Vaginal Birth or  Section  Goal: Able to cope with pain  Description: Able to cope with pain  6/15/2024 0851 by Evan Treadwell RN  Outcome: Adequate for Discharge  6/15/2024 0745 by Tasneem Bansal RN  Outcome: Progressing     Problem: Pain  Goal: Able to cope with pain  Description: Able to cope with pain  6/15/2024 0851 by Evan Treadwell RN  Outcome: Adequate for Discharge  6/15/2024 07 by Tasneem Bansal RN  Outcome: Progressing  Goal: Verbalizes/displays adequate comfort level or baseline comfort level  6/15/2024 0851 by Evan Treadwell RN  Outcome: Adequate for Discharge  6/15/2024 0745 by Tasneem Bansal RN  Outcome: Progressing     Problem: Safety - Adult  Goal: Able to ambulate with minimal assistance  Description: Able to ambulate with minimal assistance  6/15/2024 0851 by Evan Treadwell RN  Outcome: Adequate for Discharge  6/15/2024 0745 by Tasneem Bansal RN  Outcome: Progressing

## 2024-06-15 NOTE — PLAN OF CARE
Problem: Vaginal Birth or  Section  Goal: Able to cope with pain  Outcome: Progressing     Problem: Pain  Goal: Able to cope with pain  Outcome: Progressing  Goal: Verbalizes/displays adequate comfort level or baseline comfort level  Outcome: Progressing     Problem: Safety - Adult  Goal: Able to ambulate with minimal assistance  Outcome: Progressing

## 2024-06-15 NOTE — DISCHARGE INSTR - ACTIVITY
Increase activity gradually.  Pelvic rest for 6 weeks.  No intercourse, tampons, douching, baths.

## 2024-06-15 NOTE — PROGRESS NOTES
Discharge Phone Call    Patient Name: Denise Moore     OB Care Provider: Luana Hancock MD Discharge Date: 6/15/2024    Disposition of baby:    Phone Number: [phone] (home)     Attempts to Contact:  Date:    Caller  Date:    Caller  Date:    Caller    Information for the patient's :  Oscar, Ander Walker [[id number]]   Delivery Method: Vaginal, Spontaneous     1.  Now that you are at home is your pain being well controlled?   Y/N   If no, instruct to call       provider.      2. Are you breastfeeding?    Y/N    Do you need any extra support from our lactation staff?      Y/N    If yes, provide number for lactation.  3. Have you made or already had your first appointment with the baby's doctor? Y/N   If no, do      you know when to schedule it?  Y/N    4. Have you scheduled your follow-up appointment?  Y/N  If no, do you know when to schedule       it?    Y/N   If no, they can find it on printed discharge instructions.  5. Did staff discuss safe sleep during your stay? Y/N   6. Did we explain things in a way you could understand?  Y/N  7. Were we respectful of your preferences for labor and birth and include you in the plan of       care?  Y/N  If no, please explain _______________________________________________  8. Is there anyone in particular you would like to mention who provided care for you? _______      _________________________________________________________________________     9. Were you given a Post-Birth Warning Signs handout?  Y/N  Do you have it somewhere      easily accessible?  Y/N  If no, please send them a copy and ask them to put it somewhere      easily found.  10. Have you been crying excessively, having anger or mood swings that feel out of control, or       feel like you can't cope with caring for yourself or baby? Y/N   If yes, they may be showing       signs of postpartum depression and should call provider. There is also a        depression test on page C5 in their

## 2024-06-15 NOTE — PROGRESS NOTES
[facility] Ob/Gyn  Post Partum Progress Note    Subjective:   Denise Moore is a 24 y.o.  s/p  at 39w6d, PPD #2    Pregnancy has been complicated by thrombocytopenia, hypothyroidism, CF carrier status, velamentous cord insertion, and mood disorder.     Patient is doing well today without complaints.  Reports lochia is decreasing.  Pain is well controlled.  Tolerating regular diet without nausea or vomiting.  Ambulating without difficulty, denies dizziness on standing.  Had one episode of blurred vision yesterday, VSS, labs stable, has not recurred.  Voiding without difficulty.  Denies headache, vision changes, RUQ pain.  Denies chest pain, shortness of breath, fever, chills, calf pain.  Breast feeding is going well.  Desires discharge home today.     Postpartum Depression: High Risk (2024)    [place]  Depression Scale     Last EPDS Total Score: 12     Last EPDS Self Harm Result: Never        Objective:   Vitals:    06/15/24 0935   BP: 110/72   Pulse: 83   Resp: 16   Temp: 98 °F (36.7 °C)   SpO2:       GENERAL APPEARANCE: alert, well appearing, in no apparent distress  LUNGS: clear to auscultation, no wheezes, rales or rhonchi, symmetric air entry  HEART: regular rate and rhythm, no murmurs  ABDOMEN POSTPARTUM: Soft, non-tender, non-distended.  No rebound or guarding.  Fundus firm and non-tender to palpation below umbilicus.   EXTREMITIES: no redness or tenderness in the calves or thighs, no edema  SKIN: normal coloration and turgor, no rashes  NEUROLOGIC: alert, oriented, normal speech, no focal findings or movement disorder noted    Impression: S/P Vaginal Delivery    Pertinent Labs:   Lab Results   Component Value Date    WBC 11.8 (H) 2024    HGB 12.9 2024    HCT 37.6 2024    MCV 92.7 2024     (L) 2024        Assessment / Plan:  Denise Moore is a 24 y.o.  s/p  at 39w6d     PPD #2     - Doing well     - Meeting milestones     - Hgb

## 2024-06-15 NOTE — CARE COORDINATION
Social Work Consult/Assessment    Reason for Consult:    PPD score of 12     Electronic record reviewed:yes   Delivery information: 24 0906  Marital Status:single    Mob's UDS on admission:negative   Infant's UDS/Cord tox:not collected/ pending     Spoke with Mob today explained SW services.yes  Present in the room:PARTH MOMIN  Living situation:lives with boyfriend (FOB)  Address and phone: [address] [address]/ [phone]  Spouse or significant other:Dom Salter   Children:Kennedi Salter 24  Children's Protective Services involvement: denies  Support system:Mother and FOB  Domestic Violence:denies  Mental Health:has anxiety and depression and takes Zoloft. Has a psychiatrist but sees as needed   Post Partum Depression:denies any concerns at this time. MD increased Zoloft after delivery to assists with any concerns patient had. Patient aware of signs and symptoms and feels comfortable reaching out to support system and psychiatrist as needed.   Substance Abuse:denies  Social Assistance Programs: WIC np [place] no   Medicaid yes  Supplies:has car seat and crib   Every Child Succeeds:denies     Summary:MOB feels safe in home environment and ready to bring baby home. MOB has no concerns at this time. MOB works FT and is not sure if she will return FT but they will use both grandparents to help with  if needed.

## 2024-06-16 LAB
BLOOD BANK DISPENSE STATUS: NORMAL
BLOOD BANK DISPENSE STATUS: NORMAL
BLOOD BANK PRODUCT CODE: NORMAL
BLOOD BANK PRODUCT CODE: NORMAL
BPU ID: NORMAL
BPU ID: NORMAL
DESCRIPTION BLOOD BANK: NORMAL
DESCRIPTION BLOOD BANK: NORMAL

## 2024-07-02 ENCOUNTER — POSTPARTUM VISIT (OUTPATIENT)
Dept: OBGYN CLINIC | Age: 24
End: 2024-07-02
Payer: COMMERCIAL

## 2024-07-02 VITALS
DIASTOLIC BLOOD PRESSURE: 60 MMHG | HEIGHT: 63 IN | TEMPERATURE: 97 F | BODY MASS INDEX: 21.09 KG/M2 | SYSTOLIC BLOOD PRESSURE: 102 MMHG | HEART RATE: 93 BPM | WEIGHT: 119 LBS

## 2024-07-02 DIAGNOSIS — O99.280 HYPOTHYROIDISM AFFECTING PREGNANCY, ANTEPARTUM: ICD-10-CM

## 2024-07-02 DIAGNOSIS — R30.0 DYSURIA: ICD-10-CM

## 2024-07-02 DIAGNOSIS — E03.9 HYPOTHYROIDISM AFFECTING PREGNANCY, ANTEPARTUM: ICD-10-CM

## 2024-07-02 DIAGNOSIS — Z30.09 ENCOUNTER FOR OTHER GENERAL COUNSELING AND ADVICE ON CONTRACEPTION: ICD-10-CM

## 2024-07-02 DIAGNOSIS — F39 MOOD DISORDER (HCC): ICD-10-CM

## 2024-07-02 PROCEDURE — 1111F DSCHRG MED/CURRENT MED MERGE: CPT | Performed by: OBSTETRICS & GYNECOLOGY

## 2024-07-02 PROCEDURE — 99213 OFFICE O/P EST LOW 20 MIN: CPT | Performed by: OBSTETRICS & GYNECOLOGY

## 2024-07-02 PROCEDURE — 1036F TOBACCO NON-USER: CPT | Performed by: OBSTETRICS & GYNECOLOGY

## 2024-07-02 PROCEDURE — G8427 DOCREV CUR MEDS BY ELIG CLIN: HCPCS | Performed by: OBSTETRICS & GYNECOLOGY

## 2024-07-02 PROCEDURE — G8420 CALC BMI NORM PARAMETERS: HCPCS | Performed by: OBSTETRICS & GYNECOLOGY

## 2024-07-03 LAB
BACTERIA UR CULT: NORMAL
BACTERIA URNS QL MICRO: ABNORMAL /HPF
BILIRUB UR QL STRIP.AUTO: NEGATIVE
CLARITY UR: ABNORMAL
COLOR UR: YELLOW
EPI CELLS #/AREA URNS AUTO: 2 /HPF (ref 0–5)
GLUCOSE UR STRIP.AUTO-MCNC: NEGATIVE MG/DL
HGB UR QL STRIP.AUTO: ABNORMAL
HYALINE CASTS #/AREA URNS AUTO: 1 /LPF (ref 0–8)
KETONES UR STRIP.AUTO-MCNC: NEGATIVE MG/DL
LEUKOCYTE ESTERASE UR QL STRIP.AUTO: ABNORMAL
NITRITE UR QL STRIP.AUTO: NEGATIVE
PH UR STRIP.AUTO: 6 [PH] (ref 5–8)
PROT UR STRIP.AUTO-MCNC: ABNORMAL MG/DL
RBC CLUMPS #/AREA URNS AUTO: 1 /HPF (ref 0–4)
SP GR UR STRIP.AUTO: 1.02 (ref 1–1.03)
UA DIPSTICK W REFLEX MICRO PNL UR: YES
URN SPEC COLLECT METH UR: ABNORMAL
UROBILINOGEN UR STRIP-ACNC: 0.2 E.U./DL
WBC #/AREA URNS AUTO: 26 /HPF (ref 0–5)

## 2024-07-10 PROBLEM — O46.90 VAGINAL BLEEDING IN PREGNANCY: Status: RESOLVED | Noted: 2023-11-10 | Resolved: 2024-07-10

## 2024-07-10 PROBLEM — O46.92 VAGINAL BLEEDING IN PREGNANCY, SECOND TRIMESTER: Status: RESOLVED | Noted: 2024-02-03 | Resolved: 2024-07-10

## 2024-07-10 PROBLEM — O26.893 HEARTBURN DURING PREGNANCY IN THIRD TRIMESTER: Status: RESOLVED | Noted: 2023-11-10 | Resolved: 2024-07-10

## 2024-07-10 PROBLEM — E03.9 HYPOTHYROIDISM AFFECTING PREGNANCY, ANTEPARTUM: Status: ACTIVE | Noted: 2024-07-10

## 2024-07-10 PROBLEM — O99.280 HYPOTHYROIDISM AFFECTING PREGNANCY, ANTEPARTUM: Status: ACTIVE | Noted: 2024-07-10

## 2024-07-10 PROBLEM — O99.283 HYPOTHYROIDISM AFFECTING PREGNANCY IN THIRD TRIMESTER: Status: RESOLVED | Noted: 2024-04-29 | Resolved: 2024-07-10

## 2024-07-10 PROBLEM — O20.8 SUBCHORIONIC HEMORRHAGE IN FIRST TRIMESTER: Status: RESOLVED | Noted: 2023-11-10 | Resolved: 2024-07-10

## 2024-07-10 PROBLEM — E03.9 HYPOTHYROIDISM AFFECTING PREGNANCY IN THIRD TRIMESTER: Status: RESOLVED | Noted: 2024-04-29 | Resolved: 2024-07-10

## 2024-07-10 PROBLEM — O47.00 THREATENED PRETERM LABOR, ANTEPARTUM: Status: RESOLVED | Noted: 2024-05-06 | Resolved: 2024-07-10

## 2024-07-10 PROBLEM — Z34.03 ENCOUNTER FOR PRENATAL CARE IN THIRD TRIMESTER OF FIRST PREGNANCY: Status: RESOLVED | Noted: 2023-11-10 | Resolved: 2024-07-10

## 2024-07-10 PROBLEM — R12 HEARTBURN DURING PREGNANCY IN THIRD TRIMESTER: Status: RESOLVED | Noted: 2023-11-10 | Resolved: 2024-07-10

## 2024-07-10 PROBLEM — Z28.39 MATERNAL VARICELLA, NON-IMMUNE: Status: RESOLVED | Noted: 2023-11-10 | Resolved: 2024-07-10

## 2024-07-10 PROBLEM — O43.123 VELAMENTOUS INSERTION OF UMBILICAL CORD IN THIRD TRIMESTER: Status: RESOLVED | Noted: 2024-05-27 | Resolved: 2024-07-10

## 2024-07-10 PROBLEM — O21.9 NAUSEA AND VOMITING IN PREGNANCY: Status: RESOLVED | Noted: 2023-11-10 | Resolved: 2024-07-10

## 2024-07-10 PROBLEM — O09.899 MATERNAL VARICELLA, NON-IMMUNE: Status: RESOLVED | Noted: 2023-11-10 | Resolved: 2024-07-10

## 2024-07-10 NOTE — PROGRESS NOTES
Postpartum Visit    CC:   Chief Complaint   Patient presents with    Postpartum Care       24 y.o.  s/p vaginal delivery on 2024 here for her postpartum visit:    HPI:    Patient is doing well today.  Postpartum course has been uncomplicated.  Reports lochia is slowing.   Pain is well controlled.  Ambulating without difficulty, denies dizziness with standing.  Reports some burning with voiding.  Has also had an episode of blood streaked bowel movements with constipation. Denies headaches, vision changes, RUQ pain, increased LE edema.  Denies chest pain, shortness of breath, fever, chills, nausea, vomiting.      Reports moods have been doing well.  Improved with increased zoloft dose.  Follows with Psych on Friday.  Denies SI/HI.      Patient is breast feeding, which is going well.  Reports infant has been doing well without problems.      Patient is not sexually active.  She is interested in vasectomy for contraception.      Review of Systems - The following ROS was otherwise negative, except as noted in the HPI: constitutional, respiratory, cardiovascular, gastrointestinal, genitourinary    OB History  OB History    Para Term  AB Living   1 1 1 0 0 1   SAB IAB Ectopic Molar Multiple Live Births   0 0 0 0 0 1      # Outcome Date GA Lbr Rogers/2nd Weight Sex Delivery Anes PTL Lv   1 Term 24 39w6d 01:29 / :01 3.23 kg (7 lb 1.9 oz) F Vag-Spont EPI N RENO       Medications:  Prior to Admission medications    Medication Sig Start Date End Date Taking? Authorizing Provider   sertraline (ZOLOFT) 50 MG tablet  24  Yes Provider, MD Deborah   ibuprofen (ADVIL;MOTRIN) 800 MG tablet Take 1 tablet by mouth every 8 hours as needed for Pain 6/15/24  Yes Yessica Boyle, DO   sertraline (ZOLOFT) 25 MG tablet Take 3 tablets by mouth nightly 6/15/24  Yes Yessica Boyle, DO   Prenatal MV-Min-Fe Fum-FA-DHA (PRENATAL 1 PO) Take by mouth   Yes Provider, MD Deborah   famotidine (PEPCID)

## 2024-07-15 ENCOUNTER — TELEPHONE (OUTPATIENT)
Dept: OBGYN CLINIC | Age: 24
End: 2024-07-15

## 2024-07-15 DIAGNOSIS — K64.9 HEMORRHOIDS, UNSPECIFIED HEMORRHOID TYPE: Primary | ICD-10-CM

## 2024-07-15 NOTE — TELEPHONE ENCOUNTER
Patient was last seen in office by Dr. Boyle on 7/2/24 for an OB postpartum visit. Patient states that at that visit her and Dr. Boyle discussed treatments for hemorrhoids.  Patient wanted to wait and see if they would resolve on their own however they have not, so she would like a referral placed.

## 2024-07-22 ENCOUNTER — POSTPARTUM VISIT (OUTPATIENT)
Dept: OBGYN CLINIC | Age: 24
End: 2024-07-22

## 2024-07-22 VITALS
BODY MASS INDEX: 21.22 KG/M2 | HEART RATE: 85 BPM | WEIGHT: 119.8 LBS | TEMPERATURE: 97.8 F | SYSTOLIC BLOOD PRESSURE: 108 MMHG | DIASTOLIC BLOOD PRESSURE: 64 MMHG

## 2024-07-22 DIAGNOSIS — R10.2 PELVIC PAIN: ICD-10-CM

## 2024-07-22 DIAGNOSIS — O99.280 HYPOTHYROIDISM AFFECTING PREGNANCY, ANTEPARTUM: ICD-10-CM

## 2024-07-22 DIAGNOSIS — Z30.09 ENCOUNTER FOR COUNSELING REGARDING CONTRACEPTION: ICD-10-CM

## 2024-07-22 DIAGNOSIS — F39 MOOD DISORDER (HCC): ICD-10-CM

## 2024-07-22 DIAGNOSIS — E03.9 HYPOTHYROIDISM AFFECTING PREGNANCY, ANTEPARTUM: ICD-10-CM

## 2024-07-22 RX ORDER — SERTRALINE HYDROCHLORIDE 25 MG/1
75 TABLET, FILM COATED ORAL NIGHTLY
Qty: 90 TABLET | Refills: 1 | Status: SHIPPED | OUTPATIENT
Start: 2024-07-22 | End: 2024-08-21

## 2024-07-22 RX ORDER — SERTRALINE HYDROCHLORIDE 25 MG/1
75 TABLET, FILM COATED ORAL NIGHTLY
Qty: 30 TABLET | Refills: 0 | Status: CANCELLED | OUTPATIENT
Start: 2024-07-22

## 2024-07-22 NOTE — PROGRESS NOTES
Postpartum Visit    CC:   Chief Complaint   Patient presents with    Postpartum Care     Abdominal pain.   Went to gi and they want a colonoscopy.  Blood in stool       24 y.o.  s/p vaginal delivery on 2024 here for her postpartum visit:    HPI:    Patient is doing well today.  Postpartum course has been uncomplicated.  Reports lochia has stopped.   Will occasionally have some right sided pelvic pain.  Ambulating without difficulty, denies dizziness with standing.  Reports some burning with voiding.  Has also had an episode of blood streaked bowel movements with constipation, went to GI and they want to do a Colonoscopy.  Denies headaches, vision changes, RUQ pain, increased LE edema.  Denies chest pain, shortness of breath, fever, chills, nausea, vomiting.      Reports moods have been doing well.  Improved with increased zoloft dose (75mg).  Follows with Psych, missed her last appointment and needs her Zoloft refilled. Denies SI/HI.    Patient is breast feeding, which is going well.  Reports infant has been doing well without problems.      Patient is not sexually active.  She is interested in vasectomy for contraception (has consult scheduled.)      Review of Systems - The following ROS was otherwise negative, except as noted in the HPI: constitutional, respiratory, cardiovascular, gastrointestinal, genitourinary    OB History  OB History    Para Term  AB Living   1 1 1 0 0 1   SAB IAB Ectopic Molar Multiple Live Births   0 0 0 0 0 1      # Outcome Date GA Lbr Rogers/2nd Weight Sex Delivery Anes PTL Lv   1 Term 24 39w6d 01:29 / 01:01 3.23 kg (7 lb 1.9 oz) F Vag-Spont EPI N RENO       Medications:  Prior to Admission medications    Medication Sig Start Date End Date Taking? Authorizing Provider   sertraline (ZOLOFT) 25 MG tablet Take 3 tablets by mouth nightly 24 Yes Yessica Boyle, DO   Prenatal MV-Min-Fe Fum-FA-DHA (PRENATAL 1 PO) Take by mouth   Yes Provider,

## 2024-07-23 LAB — TSH SERPL DL<=0.005 MIU/L-ACNC: 2.01 UIU/ML (ref 0.27–4.2)

## 2024-08-04 ENCOUNTER — TELEPHONE (OUTPATIENT)
Dept: OBGYN | Age: 24
End: 2024-08-04

## 2024-08-08 ENCOUNTER — OFFICE VISIT (OUTPATIENT)
Dept: PRIMARY CARE CLINIC | Age: 24
End: 2024-08-08
Payer: COMMERCIAL

## 2024-08-08 VITALS
BODY MASS INDEX: 20.73 KG/M2 | WEIGHT: 117 LBS | TEMPERATURE: 99.3 F | RESPIRATION RATE: 15 BRPM | OXYGEN SATURATION: 98 % | DIASTOLIC BLOOD PRESSURE: 70 MMHG | HEART RATE: 89 BPM | SYSTOLIC BLOOD PRESSURE: 126 MMHG | HEIGHT: 63 IN

## 2024-08-08 DIAGNOSIS — H92.03 POSTERIOR AURICULAR PAIN OF BOTH EARS: Primary | ICD-10-CM

## 2024-08-08 PROCEDURE — G8420 CALC BMI NORM PARAMETERS: HCPCS | Performed by: FAMILY MEDICINE

## 2024-08-08 PROCEDURE — 99203 OFFICE O/P NEW LOW 30 MIN: CPT | Performed by: FAMILY MEDICINE

## 2024-08-08 PROCEDURE — G8427 DOCREV CUR MEDS BY ELIG CLIN: HCPCS | Performed by: FAMILY MEDICINE

## 2024-08-08 PROCEDURE — 1036F TOBACCO NON-USER: CPT | Performed by: FAMILY MEDICINE

## 2024-08-08 SDOH — ECONOMIC STABILITY: HOUSING INSECURITY
IN THE LAST 12 MONTHS, WAS THERE A TIME WHEN YOU DID NOT HAVE A STEADY PLACE TO SLEEP OR SLEPT IN A SHELTER (INCLUDING NOW)?: NO

## 2024-08-08 SDOH — ECONOMIC STABILITY: INCOME INSECURITY: HOW HARD IS IT FOR YOU TO PAY FOR THE VERY BASICS LIKE FOOD, HOUSING, MEDICAL CARE, AND HEATING?: NOT HARD AT ALL

## 2024-08-08 SDOH — ECONOMIC STABILITY: FOOD INSECURITY: WITHIN THE PAST 12 MONTHS, YOU WORRIED THAT YOUR FOOD WOULD RUN OUT BEFORE YOU GOT MONEY TO BUY MORE.: NEVER TRUE

## 2024-08-08 SDOH — ECONOMIC STABILITY: FOOD INSECURITY: WITHIN THE PAST 12 MONTHS, THE FOOD YOU BOUGHT JUST DIDN'T LAST AND YOU DIDN'T HAVE MONEY TO GET MORE.: NEVER TRUE

## 2024-08-08 ASSESSMENT — PATIENT HEALTH QUESTIONNAIRE - PHQ9
7. TROUBLE CONCENTRATING ON THINGS, SUCH AS READING THE NEWSPAPER OR WATCHING TELEVISION: NOT AT ALL
8. MOVING OR SPEAKING SO SLOWLY THAT OTHER PEOPLE COULD HAVE NOTICED. OR THE OPPOSITE, BEING SO FIGETY OR RESTLESS THAT YOU HAVE BEEN MOVING AROUND A LOT MORE THAN USUAL: SEVERAL DAYS
SUM OF ALL RESPONSES TO PHQ9 QUESTIONS 1 & 2: 0
5. POOR APPETITE OR OVEREATING: MORE THAN HALF THE DAYS
SUM OF ALL RESPONSES TO PHQ QUESTIONS 1-9: 6
SUM OF ALL RESPONSES TO PHQ QUESTIONS 1-9: 6
10. IF YOU CHECKED OFF ANY PROBLEMS, HOW DIFFICULT HAVE THESE PROBLEMS MADE IT FOR YOU TO DO YOUR WORK, TAKE CARE OF THINGS AT HOME, OR GET ALONG WITH OTHER PEOPLE: NOT DIFFICULT AT ALL
3. TROUBLE FALLING OR STAYING ASLEEP: NOT AT ALL
4. FEELING TIRED OR HAVING LITTLE ENERGY: SEVERAL DAYS
1. LITTLE INTEREST OR PLEASURE IN DOING THINGS: NOT AT ALL
6. FEELING BAD ABOUT YOURSELF - OR THAT YOU ARE A FAILURE OR HAVE LET YOURSELF OR YOUR FAMILY DOWN: MORE THAN HALF THE DAYS
SUM OF ALL RESPONSES TO PHQ QUESTIONS 1-9: 6
SUM OF ALL RESPONSES TO PHQ QUESTIONS 1-9: 6
2. FEELING DOWN, DEPRESSED OR HOPELESS: NOT AT ALL
9. THOUGHTS THAT YOU WOULD BE BETTER OFF DEAD, OR OF HURTING YOURSELF: NOT AT ALL

## 2024-08-08 ASSESSMENT — ENCOUNTER SYMPTOMS
NAUSEA: 0
RHINORRHEA: 0
SORE THROAT: 0
SHORTNESS OF BREATH: 0
COLOR CHANGE: 0
TROUBLE SWALLOWING: 0
VOMITING: 0
BLOOD IN STOOL: 0
ABDOMINAL PAIN: 0
DIARRHEA: 0
COUGH: 0

## 2024-08-08 NOTE — ASSESSMENT & PLAN NOTE
Well controlled today. Likely 2/2 Eustachian tube dysfunction. Short duration and onset. Counseled on the anatomy of this today and possible causes. Discussed congestion mitigation factors. Occasionally due to allergies and/or weather changes. Otherwise well appearing, VSS and symptoms well controlled. Call back/ED precautions discussed. ENT referral in the future if desired/persistent/does not improve.

## 2024-08-08 NOTE — PROGRESS NOTES
fever.   HENT:  Negative for congestion, rhinorrhea, sore throat and trouble swallowing.         Pain behind ears - per HPI   Respiratory:  Negative for cough and shortness of breath.    Cardiovascular:  Negative for chest pain, palpitations and leg swelling.   Gastrointestinal:  Negative for abdominal pain, blood in stool, diarrhea, nausea and vomiting.   Genitourinary:  Positive for dyspareunia (and bleeding - following with GYN for this) and pelvic pain. Negative for difficulty urinating, dysuria and hematuria.   Skin:  Positive for rash (pustules come up randomly - for years. Derm page provided for this.). Negative for color change.   Neurological:  Negative for dizziness and headaches.   Psychiatric/Behavioral:  Negative for dysphoric mood and sleep disturbance.      Objective:    Physical Exam:  Vitals:    08/08/24 1424   BP: 126/70   Site: Right Upper Arm   Position: Sitting   Cuff Size: Medium Adult   Pulse: 89   Resp: 15   Temp: 99.3 °F (37.4 °C)   TempSrc: Oral   SpO2: 98%   Weight: 53.1 kg (117 lb)   Height: 1.6 m (5' 3\")     Physical Exam  Constitutional:       General: She is not in acute distress.     Appearance: Normal appearance. She is not ill-appearing, toxic-appearing or diaphoretic.   HENT:      Head: Normocephalic and atraumatic.      Right Ear: Tympanic membrane, ear canal and external ear normal. There is no impacted cerumen.      Left Ear: Tympanic membrane, ear canal and external ear normal. There is no impacted cerumen.      Ears:      Comments: Mastoid region - normal overlying skin, no TTP  Eyes:      General: No scleral icterus.  Cardiovascular:      Rate and Rhythm: Normal rate and regular rhythm.      Pulses: Normal pulses.      Heart sounds: Normal heart sounds. No murmur heard.     No friction rub. No gallop.   Pulmonary:      Effort: Pulmonary effort is normal. No respiratory distress.      Breath sounds: Normal breath sounds. No stridor. No wheezing, rhonchi or rales.   Chest:

## 2024-08-13 ENCOUNTER — TELEPHONE (OUTPATIENT)
Dept: OBGYN CLINIC | Age: 24
End: 2024-08-13

## 2024-08-13 DIAGNOSIS — E03.9 HYPOTHYROIDISM AFFECTING PREGNANCY, ANTEPARTUM: Primary | ICD-10-CM

## 2024-08-13 DIAGNOSIS — O99.280 HYPOTHYROIDISM AFFECTING PREGNANCY, ANTEPARTUM: Primary | ICD-10-CM

## 2024-08-13 NOTE — TELEPHONE ENCOUNTER
Pt states she had endocrinology referral but they were unable to see her while pregnant. She is now trying to schedule but they are asking for a new referral. Pended. Please sign or advise

## 2024-08-15 ENCOUNTER — TELEPHONE (OUTPATIENT)
Dept: PRIMARY CARE CLINIC | Age: 24
End: 2024-08-15

## 2024-08-15 NOTE — TELEPHONE ENCOUNTER
Pt called requesting a referral to be placed to  [facility].  Pt sts the reason for the referral is already in her chart.  Pt would like referral faxed to [phone].    Advised pt that Dr Hanson was not available and a message will be sent back.

## 2024-08-15 NOTE — TELEPHONE ENCOUNTER
Message relayed to Dr Hanson and thought that  was not accepting external referrals to [facility].    Pt sts she is established with  OBGYN from her pregnancy.  Pt would like the referral to be placed and faxed to .

## 2024-08-15 NOTE — TELEPHONE ENCOUNTER
I called and spoke with Aris at [facility].  Advised Aris of what was said by our pt and per Aris, they are still not accepting external referrals from PCPs, even if pt was already established with the OBGYN.  Aris advised for pt to contact who she saw at  OBGYN and they could place the referral.  Aris sts if we place the referral, it will be declined.

## 2024-08-15 NOTE — TELEPHONE ENCOUNTER
Called and spoke with pt.  Advised pt that [facility] is not accepting any external referrals at this time and since we are not affiliated with , they would decline the referral, that the referral would have to come from a  physician. Pt acknowledged understanding.  Pt sts she will reach out to her OBGYN and see if they would place the referral.

## 2024-08-20 ENCOUNTER — OFFICE VISIT (OUTPATIENT)
Dept: OBGYN CLINIC | Age: 24
End: 2024-08-20
Payer: COMMERCIAL

## 2024-08-20 VITALS
SYSTOLIC BLOOD PRESSURE: 100 MMHG | BODY MASS INDEX: 21.08 KG/M2 | WEIGHT: 119 LBS | DIASTOLIC BLOOD PRESSURE: 66 MMHG | TEMPERATURE: 98.1 F | HEART RATE: 89 BPM

## 2024-08-20 DIAGNOSIS — R10.2 PELVIC PAIN: ICD-10-CM

## 2024-08-20 DIAGNOSIS — D69.6 THROMBOCYTOPENIA (HCC): ICD-10-CM

## 2024-08-20 DIAGNOSIS — N93.9 ABNORMAL UTERINE BLEEDING (AUB): Primary | ICD-10-CM

## 2024-08-20 PROCEDURE — 99213 OFFICE O/P EST LOW 20 MIN: CPT | Performed by: OBSTETRICS & GYNECOLOGY

## 2024-08-20 PROCEDURE — 36415 COLL VENOUS BLD VENIPUNCTURE: CPT | Performed by: OBSTETRICS & GYNECOLOGY

## 2024-08-20 PROCEDURE — G8427 DOCREV CUR MEDS BY ELIG CLIN: HCPCS | Performed by: OBSTETRICS & GYNECOLOGY

## 2024-08-20 PROCEDURE — G8420 CALC BMI NORM PARAMETERS: HCPCS | Performed by: OBSTETRICS & GYNECOLOGY

## 2024-08-20 PROCEDURE — 1036F TOBACCO NON-USER: CPT | Performed by: OBSTETRICS & GYNECOLOGY

## 2024-08-20 NOTE — PROGRESS NOTES
Return Gyn Office Visit    CC:   Chief Complaint   Patient presents with    Follow-up       HPI:  Denise Moore is a 24 y.o. female who presents for US follow-up of AUB and pelvic pain.    Patient is seen and examined today.   Overall doing well today.      Reports continues to have some pelvic pain, is not as bad as it was before.  Stopped breast feeding a month ago and her first cycle back (LMP 8/14/2024) was heavy, passing clots and changing tampons every hour.   Denies urinary symptoms.  Denies chest pain, shortness of breath, fever, chills, nausea, vomiting.      Followed up with Colonoscopy which showed a fissure and internal hemorrhoids.      Review of Systems - The following ROS was otherwise negative, except as noted in the HPI: constitutional, respiratory, cardiovascular, gastrointestinal, genitourinary    Objective:  /66 (Site: Left Upper Arm, Position: Sitting, Cuff Size: Medium Adult)   Pulse 89   Temp 98.1 °F (36.7 °C) (Infrared)   Wt 54 kg (119 lb)   LMP 08/14/2024 (Approximate)   Breastfeeding No   BMI 21.08 kg/m²     Physical Exam  Vitals reviewed.   Constitutional:       General: She is not in acute distress.     Appearance: She is well-developed.   HENT:      Head: Normocephalic and atraumatic.   Eyes:      Conjunctiva/sclera: Conjunctivae normal.   Cardiovascular:      Rate and Rhythm: Normal rate.   Pulmonary:      Effort: Pulmonary effort is normal. No respiratory distress.   Abdominal:      General: There is no distension.      Palpations: Abdomen is soft.      Tenderness: There is no abdominal tenderness. There is no guarding or rebound.   Musculoskeletal:         General: No swelling.   Skin:     General: Skin is warm and dry.   Neurological:      Mental Status: She is alert and oriented to person, place, and time.   Psychiatric:         Mood and Affect: Mood normal.         Behavior: Behavior normal.         Thought Content: Thought content normal.         Ultrasound  PELVIC  ULTRASOUND without DOPPLER INTERROGATION   NON OB     DATE: 08/20/2024     PHYSICIAN: EDIS Boyle D.O.      SONOGRAPHER: KELLI Singh [facility]     INDICATION: pelvic pain     TYPE OF SCAN: vaginal     FINDINGS:    The cul de sac is normal. No free fluid appreciated.    The cervix is normal and not enlarged.  Nabothian cyst/s is not noted within the uterine cervix.    The uterus measures 8.01 cm x 6.88 cm x 4.53 cm.    The uterus is anteverted.  The endometrium measures 1.83 mm.   The myometrium is homogeneous in appearance.   No uterine anomalies are noted.     The right ovary is present.    The right ovary measures 2.30 cm x 1.61 cm x 1.62 cm.    Ovary findings: No masses seen.  The right adnexa is normal.    The left ovary is present.    The left ovary measures 1.93 cm x 1.44 cm x 1.44 cm.    Ovary findings: No masses seen.  The left adnexa is normal.    Doppler interrogation demonstrates normal blood flow to the right and left ovaries.      IMPRESSION: Normal uterus. Normal right ovary. Normal left ovary.                            Imaging is limited secondary to bowel gas.  The patient is well aware of the limitations of ultrasound in the detection of anomalies of the abdomen and pelvis.     Assessment/Plan:     Denise Moore is a 24 y.o. female who presents for US follow-up of AUB and pelvic pain.     1. Abnormal uterine bleeding (AUB)     - Stopped breast feeding a month ago     - First cycle was heavy in flow     - Differential reviewed     - US today with ES 1.83mm      - Reviewed likely continued progesterone effect on the uterus and thin lining - discussed should improve as she is further from breast feeding     - Risks, benefits and alternatives were reviewed including expectant management as well as intervention with OCPs     - Would like to monitor and will call if desires to start medications      - Return precautions reviewed     2. Pelvic pain     - Is following with GI for Colonoscopy due to blood in stools -

## 2024-08-21 LAB
BASOPHILS # BLD: 0 K/UL (ref 0–0.2)
BASOPHILS NFR BLD: 0.5 %
DEPRECATED RDW RBC AUTO: 13.5 % (ref 12.4–15.4)
EOSINOPHIL # BLD: 0.1 K/UL (ref 0–0.6)
EOSINOPHIL NFR BLD: 3.5 %
HCT VFR BLD AUTO: 38.6 % (ref 36–48)
HGB BLD-MCNC: 13 G/DL (ref 12–16)
LYMPHOCYTES # BLD: 1.2 K/UL (ref 1–5.1)
LYMPHOCYTES NFR BLD: 37.8 %
MCH RBC QN AUTO: 30.5 PG (ref 26–34)
MCHC RBC AUTO-ENTMCNC: 33.6 G/DL (ref 31–36)
MCV RBC AUTO: 90.7 FL (ref 80–100)
MONOCYTES # BLD: 0.2 K/UL (ref 0–1.3)
MONOCYTES NFR BLD: 5.4 %
NEUTROPHILS # BLD: 1.6 K/UL (ref 1.7–7.7)
NEUTROPHILS NFR BLD: 52.8 %
PLATELET # BLD AUTO: 137 K/UL (ref 135–450)
PMV BLD AUTO: 9.1 FL (ref 5–10.5)
RBC # BLD AUTO: 4.25 M/UL (ref 4–5.2)
WBC # BLD AUTO: 3.1 K/UL (ref 4–11)

## 2024-09-27 ENCOUNTER — TELEPHONE (OUTPATIENT)
Dept: PRIMARY CARE CLINIC | Age: 24
End: 2024-09-27

## 2024-09-27 NOTE — TELEPHONE ENCOUNTER
Patient called to request a referral to Dr. Rosales at [facility].   Would like to have this faxed to [phone]

## 2024-10-07 ENCOUNTER — TELEPHONE (OUTPATIENT)
Dept: ENT CLINIC | Age: 24
End: 2024-10-07

## 2024-10-07 NOTE — TELEPHONE ENCOUNTER
Patient called stated she needed a cyst on her lip drained. She stated it was a salivary gland. Is this something dr luciano would do in office?  Patient originally asked for Dr. Tena but has seen Dr. Luciano in the past

## 2024-10-09 ENCOUNTER — HOSPITAL ENCOUNTER (OUTPATIENT)
Dept: ULTRASOUND IMAGING | Age: 24
Discharge: HOME | End: 2024-10-09
Attending: INTERNAL MEDICINE
Payer: COMMERCIAL

## 2024-10-09 DIAGNOSIS — D69.59 THROMBOCYTOPENIA DUE TO DIMINISHED PLATELET PRODUCTION: ICD-10-CM

## 2024-10-09 PROCEDURE — 76705 ECHO EXAM OF ABDOMEN: CPT

## 2024-10-10 ENCOUNTER — OFFICE VISIT (OUTPATIENT)
Dept: ENT CLINIC | Age: 24
End: 2024-10-10
Payer: COMMERCIAL

## 2024-10-10 VITALS
WEIGHT: 119 LBS | SYSTOLIC BLOOD PRESSURE: 123 MMHG | HEIGHT: 63 IN | HEART RATE: 100 BPM | BODY MASS INDEX: 21.09 KG/M2 | DIASTOLIC BLOOD PRESSURE: 82 MMHG

## 2024-10-10 DIAGNOSIS — K13.4: Primary | ICD-10-CM

## 2024-10-10 DIAGNOSIS — H60.8X3 CHRONIC ECZEMATOUS OTITIS EXTERNA OF BOTH EARS: ICD-10-CM

## 2024-10-10 DIAGNOSIS — K13.70 ORAL LESION: ICD-10-CM

## 2024-10-10 PROCEDURE — G8420 CALC BMI NORM PARAMETERS: HCPCS | Performed by: OTOLARYNGOLOGY

## 2024-10-10 PROCEDURE — 99213 OFFICE O/P EST LOW 20 MIN: CPT | Performed by: OTOLARYNGOLOGY

## 2024-10-10 PROCEDURE — 1036F TOBACCO NON-USER: CPT | Performed by: OTOLARYNGOLOGY

## 2024-10-10 PROCEDURE — G8484 FLU IMMUNIZE NO ADMIN: HCPCS | Performed by: OTOLARYNGOLOGY

## 2024-10-10 PROCEDURE — G8427 DOCREV CUR MEDS BY ELIG CLIN: HCPCS | Performed by: OTOLARYNGOLOGY

## 2024-10-10 RX ORDER — VILAZODONE HYDROCHLORIDE 20 MG/1
20 TABLET ORAL DAILY
COMMUNITY
Start: 2024-09-17

## 2024-10-10 ASSESSMENT — ENCOUNTER SYMPTOMS
SINUS PRESSURE: 0
TROUBLE SWALLOWING: 0
FACIAL SWELLING: 0
VOICE CHANGE: 0
SORE THROAT: 0
APNEA: 0
SHORTNESS OF BREATH: 0
EYE ITCHING: 0
COUGH: 0

## 2024-10-10 NOTE — PROGRESS NOTES
and reactive to light.   Neck:      Thyroid: No thyroid mass or thyromegaly.      Trachea: Trachea and phonation normal.   Cardiovascular:      Pulses: Normal pulses.   Pulmonary:      Effort: Pulmonary effort is normal. No accessory muscle usage or respiratory distress.      Breath sounds: No stridor.   Musculoskeletal:      Cervical back: Full passive range of motion without pain.   Lymphadenopathy:      Head:      Right side of head: No submental or submandibular adenopathy.      Left side of head: No submental or submandibular adenopathy.      Cervical: No cervical adenopathy.      Right cervical: No superficial, deep or posterior cervical adenopathy.     Left cervical: No superficial, deep or posterior cervical adenopathy.   Skin:     General: Skin is warm and dry.   Neurological:      Mental Status: She is alert and oriented to person, place, and time.      Cranial Nerves: No cranial nerve deficit.      Coordination: Coordination normal.      Gait: Gait normal.   Psychiatric:         Thought Content: Thought content normal.         Assessment and Plan     1. Pyogenic granuloma oral mucosa  Discussed observation versus excision wishes to schedule appointment, for excision    2. 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 Chronic eczematous otitis externa of both ears  Start oil/lotion in the ears daily          Brie Luciano DO  10/10/24      Portions of this note were dictated using Dragon. There may be linguistic errors secondary to the use of this program.

## 2024-10-12 ENCOUNTER — APPOINTMENT (OUTPATIENT)
Dept: GENERAL RADIOLOGY | Age: 24
End: 2024-10-12
Payer: COMMERCIAL

## 2024-10-12 ENCOUNTER — HOSPITAL ENCOUNTER (EMERGENCY)
Age: 24
Discharge: HOME | End: 2024-10-12
Attending: EMERGENCY MEDICINE
Payer: COMMERCIAL

## 2024-10-12 VITALS
TEMPERATURE: 98.1 F | HEART RATE: 76 BPM | RESPIRATION RATE: 16 BRPM | SYSTOLIC BLOOD PRESSURE: 118 MMHG | OXYGEN SATURATION: 99 % | DIASTOLIC BLOOD PRESSURE: 70 MMHG

## 2024-10-12 DIAGNOSIS — M25.551 RIGHT HIP PAIN: Primary | ICD-10-CM

## 2024-10-12 LAB — HCG UR QL: NEGATIVE

## 2024-10-12 PROCEDURE — 6370000000 HC RX 637 (ALT 250 FOR IP): Performed by: EMERGENCY MEDICINE

## 2024-10-12 PROCEDURE — 73502 X-RAY EXAM HIP UNI 2-3 VIEWS: CPT

## 2024-10-12 PROCEDURE — 84703 CHORIONIC GONADOTROPIN ASSAY: CPT

## 2024-10-12 PROCEDURE — 99284 EMERGENCY DEPT VISIT MOD MDM: CPT

## 2024-10-12 RX ORDER — ACETAMINOPHEN 500 MG
1000 TABLET ORAL
Status: COMPLETED | OUTPATIENT
Start: 2024-10-12 | End: 2024-10-12

## 2024-10-12 RX ADMIN — ACETAMINOPHEN 1000 MG: 500 TABLET ORAL at 07:54

## 2024-10-12 ASSESSMENT — PAIN SCALES - GENERAL: PAINLEVEL_OUTOF10: 6

## 2024-10-12 ASSESSMENT — PAIN DESCRIPTION - ORIENTATION: ORIENTATION: RIGHT

## 2024-10-12 ASSESSMENT — LIFESTYLE VARIABLES
HOW MANY STANDARD DRINKS CONTAINING ALCOHOL DO YOU HAVE ON A TYPICAL DAY: PATIENT DOES NOT DRINK
HOW OFTEN DO YOU HAVE A DRINK CONTAINING ALCOHOL: NEVER

## 2024-10-12 ASSESSMENT — PAIN - FUNCTIONAL ASSESSMENT: PAIN_FUNCTIONAL_ASSESSMENT: 0-10

## 2024-10-12 ASSESSMENT — PAIN DESCRIPTION - LOCATION: LOCATION: HIP

## 2024-10-12 NOTE — DISCHARGE INSTRUCTIONS
You were seen for right-sided hip pain.  Your exam and x-ray were reassuring.  Rest.  Use Tylenol and ibuprofen for discomfort.  Follow-up with primary care and orthopedics.  Return with any new concerns

## 2024-10-12 NOTE — ED PROVIDER NOTES
Emergency Department Provider Note  Location: [facility] ED  10/12/2024     Patient Identification  Denise Moore is a 24 y.o. female    Chief Complaint  Hip Pain (C/o right hip pain since prior to giving birth several months ago. Has seen ortho, recommended to see PT, has not followed with PT. Ambulatory without assistance. No new injury)          HPI  (History provided by patient)  Patient presents with several months of intermittent right-sided hip pain.  She reports pain in her right gluteal and right groin area.  She has been able to exercise.  Sometimes she can feel her hip snap or click.  No new injuries.  No fevers, swelling, redness, skin changes, numbness, weakness.  Takes no medications.    Nursing Notes reviewed.    Allergies:   Allergies   Allergen Reactions    Cocoa Butter Hives    Amoxicillin Rash    Doxycycline Rash     pustules    Penicillins Rash and Other (See Comments)       Past medical history:  has a past medical history of Anemia, Anxiety, Cystic fibrosis carrier, Depression, Dysmenorrhea (08/26/2017), Hypothyroidism, and Thrombocytopenia ([facility]).    Past surgical history:  has a past surgical history that includes [place] tooth extraction.    Home medications:   Prior to Admission medications    Medication Sig Start Date End Date Taking? Authorizing Provider   VIIBRYD 20 MG Tablet Take 1 tablet by mouth daily 9/17/24   ProviderDeborah MD   sertraline (ZOLOFT) 25 MG tablet Take 3 tablets by mouth nightly  Patient not taking: Reported on 10/10/2024 7/22/24 8/21/24  Yessica Boyle,    Prenatal MV-Min-Fe Fum-FA-DHA (PRENATAL 1 PO) Take by mouth    ProviderDeborah MD       Social history:  reports that she quit smoking about 5 years ago. Her smoking use included cigarettes. She started smoking about 6 years ago. She has a 0.3 pack-year smoking history. She has never used smokeless tobacco. She reports current alcohol use. She reports that she does not use  10/12/2024  EXAMINATION: ONE XRAY VIEW OF THE PELVIS AND TWO XRAY VIEWS RIGHT HIP 10/12/2024 8:56 am COMPARISON: None. HISTORY: ORDERING SYSTEM PROVIDED HISTORY: R hip pain TECHNOLOGIST PROVIDED HISTORY: Reason for exam:->R hip pain Reason for Exam: hip pain FINDINGS: The hip demonstrates normal alignment. No evidence of acute fracture.  No focal osseus lesion.   Pelvis is intact.     No acute abnormality of the hip.      Labs  Results for orders placed or performed during the hospital encounter of 10/12/24   Urine Preg (Lab)   Result Value Ref Range    Pregnancy, Urine Negative Detects HCG level >20 MIU/mL       Procedures  Procedures    ED Course           Diagnostic studies reviewed.   I do not have a clear cause for the patient's ongoing intermittent right hip symptoms-discomfort with certain movements and snapping/clicking.  No signs of fracture.    I discussed the results with patient/family including incidental findings.    Is this patient to be included in the SEP-1 core measure? No Exclusion criteria - the patient is NOT to be included for SEP-1 Core Measure due to: Infection is not suspected    Consults/discussion with other professionals: None    Clinical Impression:  1. Right hip pain          Disposition / Plan:  Decision To Discharge  Condition: stable  Blood pressure 118/70, pulse 76, temperature 98.1 °F (36.7 °C), temperature source Oral, resp. rate 16, SpO2 99%, not currently breastfeeding.    Primary care and orthopedics follow-up    Patient was given the following medications. I counseled patient how to take these medications.   New Prescriptions    No medications on file       Follow Up / Referral (if applicable):  Reinaldo Hanson MD  [address] [facility] [address]  [phone]            Isaac DOMINGUEZ, am the primary attending of record and contributed the majority of evaluation and treatment of emergent care for this encounter.     This chart was generated in part by using Dragon

## 2024-10-21 ENCOUNTER — TRANSCRIBE ORDERS (OUTPATIENT)
Dept: ADMINISTRATIVE | Age: 24
End: 2024-10-21

## 2024-10-21 DIAGNOSIS — D69.59 THROMBOCYTOPENIA, SECONDARY: Primary | ICD-10-CM

## 2024-10-22 ENCOUNTER — TELEPHONE (OUTPATIENT)
Dept: PRIMARY CARE CLINIC | Age: 24
End: 2024-10-22

## 2024-10-22 NOTE — TELEPHONE ENCOUNTER
Pt called stating that for the past couple of days she has not been feeling well and has had a sore throat with white spots.  Pt requested a same day appt with Dr Hanson today.  Advised pt there were no available appointments for today and appt for 10/23/24 in the morning was offered which pt declined.  Pt sts she will just go to an urgent care.

## 2024-10-28 ENCOUNTER — TELEPHONE (OUTPATIENT)
Dept: PRIMARY CARE CLINIC | Age: 24
End: 2024-10-28

## 2024-10-28 NOTE — TELEPHONE ENCOUNTER
Pt called to request a same day appt with Dr Hanson.  Advised pt that Dr Hanson is out of the office and will return on 10/29/24.  Pt sts she is still having continued cough and headache.  Pt sts she has been coughing up greenish grey mucous.  Advised pt a message will be sent back to Dr Hanson for there are no available appts until 10/31/24.

## 2024-12-10 ENCOUNTER — OFFICE VISIT (OUTPATIENT)
Dept: ORTHOPEDIC SURGERY | Age: 24
End: 2024-12-10
Payer: COMMERCIAL

## 2024-12-10 VITALS — HEIGHT: 63 IN | WEIGHT: 119 LBS | BODY MASS INDEX: 21.09 KG/M2

## 2024-12-10 DIAGNOSIS — R52 PAIN: Primary | ICD-10-CM

## 2024-12-10 DIAGNOSIS — S39.012A STRAIN OF LUMBAR REGION, INITIAL ENCOUNTER: ICD-10-CM

## 2024-12-10 PROCEDURE — G8484 FLU IMMUNIZE NO ADMIN: HCPCS | Performed by: PHYSICIAN ASSISTANT

## 2024-12-10 PROCEDURE — G8428 CUR MEDS NOT DOCUMENT: HCPCS | Performed by: PHYSICIAN ASSISTANT

## 2024-12-10 PROCEDURE — G8420 CALC BMI NORM PARAMETERS: HCPCS | Performed by: PHYSICIAN ASSISTANT

## 2024-12-10 PROCEDURE — 1036F TOBACCO NON-USER: CPT | Performed by: PHYSICIAN ASSISTANT

## 2024-12-10 PROCEDURE — 99204 OFFICE O/P NEW MOD 45 MIN: CPT | Performed by: PHYSICIAN ASSISTANT

## 2024-12-10 RX ORDER — MELOXICAM 15 MG/1
15 TABLET ORAL DAILY
Qty: 90 TABLET | Refills: 0 | Status: SHIPPED | OUTPATIENT
Start: 2024-12-10

## 2024-12-18 ENCOUNTER — HOSPITAL ENCOUNTER (OUTPATIENT)
Dept: PHYSICAL THERAPY | Age: 24
Setting detail: THERAPIES SERIES
Discharge: HOME | End: 2024-12-18
Payer: COMMERCIAL

## 2024-12-18 DIAGNOSIS — M54.50 LUMBAR PAIN: Primary | ICD-10-CM

## 2024-12-18 DIAGNOSIS — M25.552 LEFT HIP PAIN: ICD-10-CM

## 2024-12-18 PROCEDURE — G0283 ELEC STIM OTHER THAN WOUND: HCPCS

## 2024-12-18 PROCEDURE — 97161 PT EVAL LOW COMPLEX 20 MIN: CPT

## 2024-12-18 PROCEDURE — 97110 THERAPEUTIC EXERCISES: CPT

## 2024-12-18 NOTE — PLAN OF CARE
[facility]- [facility]  [address], [place] [address] office: [phone] fax: [phone]     Physical Therapy Initial Evaluation Certification      Dear Abraham Tillman PA-C ,    We had the pleasure of evaluating the following patient for physical therapy services at [facility].  A summary of our findings can be found in the initial assessment below.  This includes our plan of care.  If you have any questions or concerns regarding these findings, please do not hesitate to contact me at the office phone number listed above.  Thank you for the referral.     Physician Signature:_______________________________Date:__________________  By signing above (or electronic signature), therapist’s plan is approved by physician       Physical Therapy: TREATMENT/PROGRESS NOTE   Patient: Denise Moore (24 y.o. female)   Examination Date: 2024   :  2000 MRN: [id number]   Visit #: 1   Insurance Allowable Auth Needed   30 [x]Yes-after 30    []No    Insurance: Payor: [employer] / Plan: [employer] [place] MEDICAID / Product Type: *No Product type* /   Insurance ID: [id number] - (Medicaid Managed)  Secondary Insurance (if applicable):    Treatment Diagnosis:     ICD-10-CM    1. Lumbar pain  M54.50       2. Left hip pain  M25.552          Medical Diagnosis:  Strain of lumbar region, initial encounter [S39.012A]   Referring Physician: Abraham Tillman PA-C  PCP: Artemio Dodge MD     Plan of care signed (Y/N):     Date of Patient follow up with Physician:      Plan of Care Report: EVAL today  POC update due: (10 visits /OR AUTH LIMITS, whichever is less)  2025                                             Medical History:  Comorbidities:  Anxiety  Depression  Relevant Medical History: gave birth with epidural in  of this year                                         Precautions/ Contra-indications:           Latex allergy:

## 2024-12-20 ENCOUNTER — HOSPITAL ENCOUNTER (OUTPATIENT)
Dept: PHYSICAL THERAPY | Age: 24
Setting detail: THERAPIES SERIES
Discharge: HOME | End: 2024-12-20
Payer: COMMERCIAL

## 2024-12-20 PROCEDURE — 97110 THERAPEUTIC EXERCISES: CPT

## 2024-12-20 PROCEDURE — G0283 ELEC STIM OTHER THAN WOUND: HCPCS

## 2024-12-20 PROCEDURE — 97140 MANUAL THERAPY 1/> REGIONS: CPT

## 2024-12-20 PROCEDURE — 97112 NEUROMUSCULAR REEDUCATION: CPT

## 2024-12-20 NOTE — FLOWSHEET NOTE
[facility]- [facility]patient [facility]  [address], [place] [address] office: [phone] fax: [phone]       Physical Therapy: TREATMENT/PROGRESS NOTE   Patient: Denise Moore (24 y.o. female)   Examination Date: 2024   :  2000 MRN: [id number]   Visit #: 2   Insurance Allowable Auth Needed   30 [x]Yes-after 30    []No    Insurance: Payor: [employer] / Plan: [employer] [place] MEDICAID / Product Type: *No Product type* /   Insurance ID: [id number] - (Medicaid Managed)  Secondary Insurance (if applicable):    Treatment Diagnosis:     ICD-10-CM    1. Lumbar pain  M54.50       2. Left hip pain  M25.552          Medical Diagnosis:  Strain of lumbar region, initial encounter [S39.012A]   Referring Physician: Abraham Tillman PA-C  PCP: Artemio Dodge MD     Plan of care signed (Y/N):     Date of Patient follow up with Physician:      Plan of Care Report: NO  POC update due: (10 visits /OR AUTH LIMITS, whichever is less)  2025                                             Medical History:  Comorbidities:  Anxiety  Depression  Relevant Medical History: gave birth with epidural in  of this year                                         Precautions/ Contra-indications:           Latex allergy:  NO  Pacemaker:    NO  Contraindications for Manipulation: None  Date of Surgery: NA  Other:    Red Flags:  None    Suicide Screening:   The patient did not verbalize a primary behavioral concern, suicidal ideation, suicidal intent, or demonstrate suicidal behaviors.    Preferred Language for Healthcare:   [x] English       [] other:    SUBJECTIVE EXAMINATION     Patient stated complaint: Pt reports tender spot on R side lower back feeling like muscle knot. Tried HEP once since two days ago with no issues.      EVAL: Pt presents after she said she had moments of sharp shooting pain in her low back. She said she also has ongoing L hip pain for many years and didn't follow up